# Patient Record
Sex: MALE | Race: WHITE | NOT HISPANIC OR LATINO | Employment: OTHER | URBAN - METROPOLITAN AREA
[De-identification: names, ages, dates, MRNs, and addresses within clinical notes are randomized per-mention and may not be internally consistent; named-entity substitution may affect disease eponyms.]

---

## 2017-02-10 ENCOUNTER — COMMUNICATION - HEALTHEAST (OUTPATIENT)
Dept: FAMILY MEDICINE | Facility: CLINIC | Age: 57
End: 2017-02-10

## 2017-02-10 DIAGNOSIS — I10 ESSENTIAL HYPERTENSION: ICD-10-CM

## 2017-02-27 ENCOUNTER — OFFICE VISIT - HEALTHEAST (OUTPATIENT)
Dept: FAMILY MEDICINE | Facility: CLINIC | Age: 57
End: 2017-02-27

## 2017-02-27 DIAGNOSIS — E53.8 OTHER B-COMPLEX DEFICIENCIES: ICD-10-CM

## 2017-02-27 DIAGNOSIS — Z12.5 PROSTATE CANCER SCREENING: ICD-10-CM

## 2017-02-27 DIAGNOSIS — R10.84 GENERALIZED ABDOMINAL PAIN: ICD-10-CM

## 2017-02-27 DIAGNOSIS — I10 ESSENTIAL HYPERTENSION: ICD-10-CM

## 2017-02-27 DIAGNOSIS — E78.00 PURE HYPERCHOLESTEROLEMIA: ICD-10-CM

## 2017-02-27 DIAGNOSIS — C18.9 COLON CANCER (H): ICD-10-CM

## 2017-02-27 LAB
CHOLEST SERPL-MCNC: 180 MG/DL
FASTING STATUS PATIENT QL REPORTED: YES
HDLC SERPL-MCNC: 53 MG/DL
LDLC SERPL CALC-MCNC: 98 MG/DL
PSA SERPL-MCNC: 0.5 NG/ML (ref 0–3.5)
TRIGL SERPL-MCNC: 147 MG/DL

## 2017-02-27 ASSESSMENT — MIFFLIN-ST. JEOR: SCORE: 1719.79

## 2017-02-28 ENCOUNTER — COMMUNICATION - HEALTHEAST (OUTPATIENT)
Dept: FAMILY MEDICINE | Facility: CLINIC | Age: 57
End: 2017-02-28

## 2017-04-10 ENCOUNTER — RECORDS - HEALTHEAST (OUTPATIENT)
Dept: ADMINISTRATIVE | Facility: OTHER | Age: 57
End: 2017-04-10

## 2017-04-11 ENCOUNTER — RECORDS - HEALTHEAST (OUTPATIENT)
Dept: ADMINISTRATIVE | Facility: OTHER | Age: 57
End: 2017-04-11

## 2017-04-25 ENCOUNTER — COMMUNICATION - HEALTHEAST (OUTPATIENT)
Dept: FAMILY MEDICINE | Facility: CLINIC | Age: 57
End: 2017-04-25

## 2017-04-25 DIAGNOSIS — E53.8 OTHER B-COMPLEX DEFICIENCIES: ICD-10-CM

## 2017-05-13 ENCOUNTER — COMMUNICATION - HEALTHEAST (OUTPATIENT)
Dept: FAMILY MEDICINE | Facility: CLINIC | Age: 57
End: 2017-05-13

## 2017-05-13 DIAGNOSIS — I10 ESSENTIAL HYPERTENSION: ICD-10-CM

## 2017-07-05 ENCOUNTER — COMMUNICATION - HEALTHEAST (OUTPATIENT)
Dept: FAMILY MEDICINE | Facility: CLINIC | Age: 57
End: 2017-07-05

## 2017-07-07 ENCOUNTER — OFFICE VISIT - HEALTHEAST (OUTPATIENT)
Dept: FAMILY MEDICINE | Facility: CLINIC | Age: 57
End: 2017-07-07

## 2017-07-07 DIAGNOSIS — R20.0 NUMBNESS: ICD-10-CM

## 2017-07-07 DIAGNOSIS — C18.9 COLON CANCER (H): ICD-10-CM

## 2017-07-07 DIAGNOSIS — E78.00 PURE HYPERCHOLESTEROLEMIA: ICD-10-CM

## 2017-07-07 DIAGNOSIS — I10 ESSENTIAL HYPERTENSION: ICD-10-CM

## 2017-07-07 DIAGNOSIS — E53.8 OTHER B-COMPLEX DEFICIENCIES: ICD-10-CM

## 2017-07-07 DIAGNOSIS — R10.11 RIGHT UPPER QUADRANT ABDOMINAL PAIN: ICD-10-CM

## 2017-07-07 ASSESSMENT — MIFFLIN-ST. JEOR: SCORE: 1715.25

## 2017-07-08 ENCOUNTER — AMBULATORY - HEALTHEAST (OUTPATIENT)
Dept: FAMILY MEDICINE | Facility: CLINIC | Age: 57
End: 2017-07-08

## 2017-07-08 DIAGNOSIS — C18.9 COLON CANCER (H): ICD-10-CM

## 2017-07-08 DIAGNOSIS — R10.11 RIGHT UPPER QUADRANT ABDOMINAL PAIN: ICD-10-CM

## 2017-07-10 ENCOUNTER — COMMUNICATION - HEALTHEAST (OUTPATIENT)
Dept: FAMILY MEDICINE | Facility: CLINIC | Age: 57
End: 2017-07-10

## 2017-07-11 ENCOUNTER — HOSPITAL ENCOUNTER (OUTPATIENT)
Dept: CT IMAGING | Facility: CLINIC | Age: 57
Discharge: HOME | End: 2017-07-11
Attending: FAMILY MEDICINE

## 2017-07-11 DIAGNOSIS — C18.9 COLON CANCER (H): ICD-10-CM

## 2017-07-11 DIAGNOSIS — R10.11 RIGHT UPPER QUADRANT ABDOMINAL PAIN: ICD-10-CM

## 2017-07-12 ENCOUNTER — AMBULATORY - HEALTHEAST (OUTPATIENT)
Dept: FAMILY MEDICINE | Facility: CLINIC | Age: 57
End: 2017-07-12

## 2017-07-12 DIAGNOSIS — R10.9 ABDOMINAL PAIN: ICD-10-CM

## 2017-08-14 ENCOUNTER — RECORDS - HEALTHEAST (OUTPATIENT)
Dept: ADMINISTRATIVE | Facility: OTHER | Age: 57
End: 2017-08-14

## 2017-09-07 ENCOUNTER — COMMUNICATION - HEALTHEAST (OUTPATIENT)
Dept: FAMILY MEDICINE | Facility: CLINIC | Age: 57
End: 2017-09-07

## 2017-09-07 DIAGNOSIS — E78.00 PURE HYPERCHOLESTEROLEMIA: ICD-10-CM

## 2017-11-30 ENCOUNTER — OFFICE VISIT - HEALTHEAST (OUTPATIENT)
Dept: FAMILY MEDICINE | Facility: CLINIC | Age: 57
End: 2017-11-30

## 2017-11-30 DIAGNOSIS — N52.9 IMPOTENCE OF ORGANIC ORIGIN: ICD-10-CM

## 2017-11-30 DIAGNOSIS — C18.2 MALIGNANT NEOPLASM OF ASCENDING COLON (H): ICD-10-CM

## 2017-11-30 DIAGNOSIS — I10 ESSENTIAL HYPERTENSION: ICD-10-CM

## 2017-11-30 DIAGNOSIS — E78.00 PURE HYPERCHOLESTEROLEMIA: ICD-10-CM

## 2017-11-30 RX ORDER — ASPIRIN 81 MG/1
81 TABLET, CHEWABLE ORAL DAILY
Status: SHIPPED | COMMUNITY
Start: 2017-11-30

## 2017-11-30 ASSESSMENT — MIFFLIN-ST. JEOR: SCORE: 1747.01

## 2017-12-11 ENCOUNTER — COMMUNICATION - HEALTHEAST (OUTPATIENT)
Dept: FAMILY MEDICINE | Facility: CLINIC | Age: 57
End: 2017-12-11

## 2017-12-11 DIAGNOSIS — E78.00 PURE HYPERCHOLESTEROLEMIA: ICD-10-CM

## 2018-03-07 ENCOUNTER — COMMUNICATION - HEALTHEAST (OUTPATIENT)
Dept: FAMILY MEDICINE | Facility: CLINIC | Age: 58
End: 2018-03-07

## 2018-03-07 DIAGNOSIS — I10 ESSENTIAL HYPERTENSION: ICD-10-CM

## 2018-03-16 ENCOUNTER — COMMUNICATION - HEALTHEAST (OUTPATIENT)
Dept: FAMILY MEDICINE | Facility: CLINIC | Age: 58
End: 2018-03-16

## 2018-03-16 DIAGNOSIS — N52.9 ED (ERECTILE DYSFUNCTION): ICD-10-CM

## 2018-03-16 DIAGNOSIS — E53.9 VITAMIN B-COMPLEX DEFICIENCY: ICD-10-CM

## 2018-05-14 ENCOUNTER — OFFICE VISIT - HEALTHEAST (OUTPATIENT)
Dept: FAMILY MEDICINE | Facility: CLINIC | Age: 58
End: 2018-05-14

## 2018-05-14 DIAGNOSIS — E78.00 PURE HYPERCHOLESTEROLEMIA: ICD-10-CM

## 2018-05-14 DIAGNOSIS — I10 ESSENTIAL HYPERTENSION: ICD-10-CM

## 2018-05-14 DIAGNOSIS — N52.9 IMPOTENCE OF ORGANIC ORIGIN: ICD-10-CM

## 2018-05-14 DIAGNOSIS — Z00.00 WELLNESS EXAMINATION: ICD-10-CM

## 2018-05-14 LAB
ALBUMIN SERPL-MCNC: 4.1 G/DL (ref 3.5–5)
ALP SERPL-CCNC: 88 U/L (ref 45–120)
ALT SERPL W P-5'-P-CCNC: 38 U/L (ref 0–45)
ANION GAP SERPL CALCULATED.3IONS-SCNC: 12 MMOL/L (ref 5–18)
AST SERPL W P-5'-P-CCNC: 28 U/L (ref 0–40)
BILIRUB SERPL-MCNC: 0.8 MG/DL (ref 0–1)
BUN SERPL-MCNC: 10 MG/DL (ref 8–22)
CALCIUM SERPL-MCNC: 10.3 MG/DL (ref 8.5–10.5)
CHLORIDE BLD-SCNC: 104 MMOL/L (ref 98–107)
CHOLEST SERPL-MCNC: 184 MG/DL
CO2 SERPL-SCNC: 26 MMOL/L (ref 22–31)
CREAT SERPL-MCNC: 0.81 MG/DL (ref 0.7–1.3)
ERYTHROCYTE [DISTWIDTH] IN BLOOD BY AUTOMATED COUNT: 13.1 % (ref 11–14.5)
FASTING STATUS PATIENT QL REPORTED: YES
GFR SERPL CREATININE-BSD FRML MDRD: >60 ML/MIN/1.73M2
GLUCOSE BLD-MCNC: 107 MG/DL (ref 70–125)
HCT VFR BLD AUTO: 44.3 % (ref 40–54)
HDLC SERPL-MCNC: 67 MG/DL
HGB BLD-MCNC: 15.3 G/DL (ref 14–18)
LDLC SERPL CALC-MCNC: 104 MG/DL
MCH RBC QN AUTO: 31.2 PG (ref 27–34)
MCHC RBC AUTO-ENTMCNC: 34.5 G/DL (ref 32–36)
MCV RBC AUTO: 90 FL (ref 80–100)
PLATELET # BLD AUTO: 198 THOU/UL (ref 140–440)
PMV BLD AUTO: 10 FL (ref 8.5–12.5)
POTASSIUM BLD-SCNC: 4.4 MMOL/L (ref 3.5–5)
PROT SERPL-MCNC: 7.6 G/DL (ref 6–8)
PSA SERPL-MCNC: 0.6 NG/ML (ref 0–4.5)
RBC # BLD AUTO: 4.9 MILL/UL (ref 4.4–6.2)
SODIUM SERPL-SCNC: 142 MMOL/L (ref 136–145)
TRIGL SERPL-MCNC: 64 MG/DL
WBC: 6.2 THOU/UL (ref 4–11)

## 2018-05-14 ASSESSMENT — MIFFLIN-ST. JEOR: SCORE: 1771.39

## 2018-05-15 ENCOUNTER — COMMUNICATION - HEALTHEAST (OUTPATIENT)
Dept: FAMILY MEDICINE | Facility: CLINIC | Age: 58
End: 2018-05-15

## 2018-07-03 ENCOUNTER — COMMUNICATION - HEALTHEAST (OUTPATIENT)
Dept: FAMILY MEDICINE | Facility: CLINIC | Age: 58
End: 2018-07-03

## 2018-07-03 DIAGNOSIS — I10 ESSENTIAL HYPERTENSION: ICD-10-CM

## 2018-10-29 ENCOUNTER — OFFICE VISIT - HEALTHEAST (OUTPATIENT)
Dept: FAMILY MEDICINE | Facility: CLINIC | Age: 58
End: 2018-10-29

## 2018-10-29 DIAGNOSIS — R07.9 ACUTE CHEST PAIN: ICD-10-CM

## 2018-10-29 DIAGNOSIS — M62.81 GENERALIZED MUSCLE WEAKNESS: ICD-10-CM

## 2018-10-29 DIAGNOSIS — Z00.00 ROUTINE ADULT HEALTH MAINTENANCE: ICD-10-CM

## 2018-10-29 LAB
ANION GAP SERPL CALCULATED.3IONS-SCNC: 11 MMOL/L (ref 5–18)
ATRIAL RATE - MUSE: 69 BPM
BUN SERPL-MCNC: 9 MG/DL (ref 8–22)
CALCIUM SERPL-MCNC: 10.8 MG/DL (ref 8.5–10.5)
CHLORIDE BLD-SCNC: 102 MMOL/L (ref 98–107)
CO2 SERPL-SCNC: 26 MMOL/L (ref 22–31)
CREAT SERPL-MCNC: 0.77 MG/DL (ref 0.7–1.3)
DIASTOLIC BLOOD PRESSURE - MUSE: NORMAL MMHG
ERYTHROCYTE [DISTWIDTH] IN BLOOD BY AUTOMATED COUNT: 11.9 % (ref 11–14.5)
GFR SERPL CREATININE-BSD FRML MDRD: >60 ML/MIN/1.73M2
GLUCOSE BLD-MCNC: 110 MG/DL (ref 70–125)
HCT VFR BLD AUTO: 42.8 % (ref 40–54)
HGB BLD-MCNC: 15 G/DL (ref 14–18)
INTERPRETATION ECG - MUSE: NORMAL
MCH RBC QN AUTO: 31.9 PG (ref 27–34)
MCHC RBC AUTO-ENTMCNC: 35.2 G/DL (ref 32–36)
MCV RBC AUTO: 91 FL (ref 80–100)
P AXIS - MUSE: 35 DEGREES
PLATELET # BLD AUTO: 209 THOU/UL (ref 140–440)
PMV BLD AUTO: 7.8 FL (ref 7–10)
POTASSIUM BLD-SCNC: 4.4 MMOL/L (ref 3.5–5)
PR INTERVAL - MUSE: 168 MS
QRS DURATION - MUSE: 88 MS
QT - MUSE: 388 MS
QTC - MUSE: 415 MS
R AXIS - MUSE: 18 DEGREES
RBC # BLD AUTO: 4.72 MILL/UL (ref 4.4–6.2)
SODIUM SERPL-SCNC: 139 MMOL/L (ref 136–145)
SYSTOLIC BLOOD PRESSURE - MUSE: NORMAL MMHG
T AXIS - MUSE: 13 DEGREES
VENTRICULAR RATE- MUSE: 69 BPM
WBC: 5.6 THOU/UL (ref 4–11)

## 2018-12-11 ENCOUNTER — COMMUNICATION - HEALTHEAST (OUTPATIENT)
Dept: FAMILY MEDICINE | Facility: CLINIC | Age: 58
End: 2018-12-11

## 2018-12-11 DIAGNOSIS — I10 ESSENTIAL HYPERTENSION: ICD-10-CM

## 2019-01-14 ENCOUNTER — COMMUNICATION - HEALTHEAST (OUTPATIENT)
Dept: FAMILY MEDICINE | Facility: CLINIC | Age: 59
End: 2019-01-14

## 2019-01-14 DIAGNOSIS — E78.00 PURE HYPERCHOLESTEROLEMIA: ICD-10-CM

## 2019-04-25 ENCOUNTER — COMMUNICATION - HEALTHEAST (OUTPATIENT)
Dept: FAMILY MEDICINE | Facility: CLINIC | Age: 59
End: 2019-04-25

## 2019-04-25 DIAGNOSIS — E53.9 VITAMIN B-COMPLEX DEFICIENCY: ICD-10-CM

## 2019-05-20 ENCOUNTER — OFFICE VISIT - HEALTHEAST (OUTPATIENT)
Dept: FAMILY MEDICINE | Facility: CLINIC | Age: 59
End: 2019-05-20

## 2019-05-20 ENCOUNTER — COMMUNICATION - HEALTHEAST (OUTPATIENT)
Dept: FAMILY MEDICINE | Facility: CLINIC | Age: 59
End: 2019-05-20

## 2019-05-20 DIAGNOSIS — Z00.00 ROUTINE ADULT HEALTH MAINTENANCE: ICD-10-CM

## 2019-05-20 DIAGNOSIS — R06.00 DYSPNEA, UNSPECIFIED TYPE: ICD-10-CM

## 2019-05-20 DIAGNOSIS — E66.3 OVERWEIGHT: ICD-10-CM

## 2019-05-20 LAB
ANION GAP SERPL CALCULATED.3IONS-SCNC: 11 MMOL/L (ref 5–18)
BUN SERPL-MCNC: 11 MG/DL (ref 8–22)
CALCIUM SERPL-MCNC: 10.2 MG/DL (ref 8.5–10.5)
CHLORIDE BLD-SCNC: 106 MMOL/L (ref 98–107)
CHOLEST SERPL-MCNC: 158 MG/DL
CO2 SERPL-SCNC: 24 MMOL/L (ref 22–31)
CREAT SERPL-MCNC: 0.8 MG/DL (ref 0.7–1.3)
ERYTHROCYTE [DISTWIDTH] IN BLOOD BY AUTOMATED COUNT: 13 % (ref 11–14.5)
FASTING STATUS PATIENT QL REPORTED: YES
GFR SERPL CREATININE-BSD FRML MDRD: >60 ML/MIN/1.73M2
GLUCOSE BLD-MCNC: 106 MG/DL (ref 70–125)
HBA1C MFR BLD: 5.6 % (ref 3.5–6)
HCT VFR BLD AUTO: 46.7 % (ref 40–54)
HDLC SERPL-MCNC: 51 MG/DL
HGB BLD-MCNC: 15.8 G/DL (ref 14–18)
LDLC SERPL CALC-MCNC: 78 MG/DL
MCH RBC QN AUTO: 32.8 PG (ref 27–34)
MCHC RBC AUTO-ENTMCNC: 33.9 G/DL (ref 32–36)
MCV RBC AUTO: 97 FL (ref 80–100)
PLATELET # BLD AUTO: 214 THOU/UL (ref 140–440)
PMV BLD AUTO: 7.7 FL (ref 7–10)
POTASSIUM BLD-SCNC: 4.3 MMOL/L (ref 3.5–5)
PSA SERPL-MCNC: 0.4 NG/ML (ref 0–4.5)
RBC # BLD AUTO: 4.82 MILL/UL (ref 4.4–6.2)
SODIUM SERPL-SCNC: 141 MMOL/L (ref 136–145)
TRIGL SERPL-MCNC: 143 MG/DL
WBC: 5.9 THOU/UL (ref 4–11)

## 2019-05-20 ASSESSMENT — MIFFLIN-ST. JEOR: SCORE: 1784.42

## 2019-06-05 ENCOUNTER — HOSPITAL ENCOUNTER (OUTPATIENT)
Dept: CARDIOLOGY | Facility: CLINIC | Age: 59
Discharge: HOME | End: 2019-06-05
Attending: FAMILY MEDICINE

## 2019-06-05 DIAGNOSIS — R06.00 DYSPNEA, UNSPECIFIED TYPE: ICD-10-CM

## 2019-06-05 LAB
CV STRESS CURRENT BP HE: NORMAL
CV STRESS CURRENT HR HE: 106
CV STRESS CURRENT HR HE: 106
CV STRESS CURRENT HR HE: 117
CV STRESS CURRENT HR HE: 119
CV STRESS CURRENT HR HE: 125
CV STRESS CURRENT HR HE: 126
CV STRESS CURRENT HR HE: 133
CV STRESS CURRENT HR HE: 133
CV STRESS CURRENT HR HE: 137
CV STRESS CURRENT HR HE: 137
CV STRESS CURRENT HR HE: 138
CV STRESS CURRENT HR HE: 138
CV STRESS CURRENT HR HE: 143
CV STRESS CURRENT HR HE: 153
CV STRESS CURRENT HR HE: 156
CV STRESS CURRENT HR HE: 156
CV STRESS CURRENT HR HE: 89
CV STRESS CURRENT HR HE: 90
CV STRESS CURRENT HR HE: 90
CV STRESS CURRENT HR HE: 92
CV STRESS CURRENT HR HE: 94
CV STRESS CURRENT HR HE: 95
CV STRESS CURRENT HR HE: 98
CV STRESS CURRENT HR HE: 98
CV STRESS DEVIATION TIME HE: NORMAL
CV STRESS ECHO PERCENT HR HE: NORMAL
CV STRESS EXERCISE STAGE HE: NORMAL
CV STRESS FINAL RESTING BP HE: NORMAL
CV STRESS FINAL RESTING HR HE: 89
CV STRESS MAX HR HE: 157
CV STRESS MAX TREADMILL GRADE HE: 14
CV STRESS MAX TREADMILL SPEED HE: 3.4
CV STRESS PEAK DIA BP HE: NORMAL
CV STRESS PEAK SYS BP HE: NORMAL
CV STRESS PHASE HE: NORMAL
CV STRESS PROTOCOL HE: NORMAL
CV STRESS RESTING PT POSITION HE: NORMAL
CV STRESS RESTING PT POSITION HE: NORMAL
CV STRESS ST DEVIATION AMOUNT HE: NORMAL
CV STRESS ST DEVIATION ELEVATION HE: NORMAL
CV STRESS ST EVELATION AMOUNT HE: NORMAL
CV STRESS TEST TYPE HE: NORMAL
CV STRESS TOTAL STAGE TIME MIN 1 HE: NORMAL
ECHO EJECTION FRACTION ESTIMATED: 60 %
STRESS ECHO BASELINE BP: NORMAL
STRESS ECHO BASELINE HR: 91
STRESS ECHO CALCULATED PERCENT HR: 99 %
STRESS ECHO LAST STRESS BP: NORMAL
STRESS ECHO LAST STRESS HR: 156
STRESS ECHO POST ESTIMATED WORKLOAD: 8.7
STRESS ECHO POST EXERCISE DUR MIN: 7
STRESS ECHO POST EXERCISE DUR SEC: 15
STRESS ECHO TARGET HR: 135

## 2019-12-31 ENCOUNTER — COMMUNICATION - HEALTHEAST (OUTPATIENT)
Dept: FAMILY MEDICINE | Facility: CLINIC | Age: 59
End: 2019-12-31

## 2019-12-31 DIAGNOSIS — I10 ESSENTIAL HYPERTENSION: ICD-10-CM

## 2020-02-06 ENCOUNTER — COMMUNICATION - HEALTHEAST (OUTPATIENT)
Dept: FAMILY MEDICINE | Facility: CLINIC | Age: 60
End: 2020-02-06

## 2020-02-06 DIAGNOSIS — E78.00 PURE HYPERCHOLESTEROLEMIA: ICD-10-CM

## 2020-05-05 ENCOUNTER — COMMUNICATION - HEALTHEAST (OUTPATIENT)
Dept: FAMILY MEDICINE | Facility: CLINIC | Age: 60
End: 2020-05-05

## 2020-05-05 ENCOUNTER — OFFICE VISIT - HEALTHEAST (OUTPATIENT)
Dept: FAMILY MEDICINE | Facility: CLINIC | Age: 60
End: 2020-05-05

## 2020-05-05 DIAGNOSIS — E78.00 PURE HYPERCHOLESTEROLEMIA: ICD-10-CM

## 2020-05-05 DIAGNOSIS — I10 ESSENTIAL HYPERTENSION: ICD-10-CM

## 2020-05-05 DIAGNOSIS — E53.9 VITAMIN B-COMPLEX DEFICIENCY: ICD-10-CM

## 2020-06-18 ENCOUNTER — COMMUNICATION - HEALTHEAST (OUTPATIENT)
Dept: FAMILY MEDICINE | Facility: CLINIC | Age: 60
End: 2020-06-18

## 2020-06-18 DIAGNOSIS — E53.9 VITAMIN B-COMPLEX DEFICIENCY: ICD-10-CM

## 2020-08-11 ENCOUNTER — COMMUNICATION - HEALTHEAST (OUTPATIENT)
Dept: FAMILY MEDICINE | Facility: CLINIC | Age: 60
End: 2020-08-11

## 2020-08-11 DIAGNOSIS — E78.00 PURE HYPERCHOLESTEROLEMIA: ICD-10-CM

## 2020-10-20 ENCOUNTER — OFFICE VISIT - HEALTHEAST (OUTPATIENT)
Dept: FAMILY MEDICINE | Facility: CLINIC | Age: 60
End: 2020-10-20

## 2020-10-20 DIAGNOSIS — I10 HYPERTENSION, UNSPECIFIED TYPE: ICD-10-CM

## 2020-10-20 DIAGNOSIS — Z00.00 ROUTINE ADULT HEALTH MAINTENANCE: ICD-10-CM

## 2020-10-20 DIAGNOSIS — R10.13 ABDOMINAL PAIN, EPIGASTRIC: ICD-10-CM

## 2020-10-20 LAB
ANION GAP SERPL CALCULATED.3IONS-SCNC: 10 MMOL/L (ref 5–18)
BUN SERPL-MCNC: 9 MG/DL (ref 8–22)
CALCIUM SERPL-MCNC: 9.8 MG/DL (ref 8.5–10.5)
CHLORIDE BLD-SCNC: 103 MMOL/L (ref 98–107)
CHOLEST SERPL-MCNC: 198 MG/DL
CO2 SERPL-SCNC: 27 MMOL/L (ref 22–31)
CREAT SERPL-MCNC: 0.8 MG/DL (ref 0.7–1.3)
ERYTHROCYTE [DISTWIDTH] IN BLOOD BY AUTOMATED COUNT: 13.7 % (ref 11–14.5)
FASTING STATUS PATIENT QL REPORTED: YES
GFR SERPL CREATININE-BSD FRML MDRD: >60 ML/MIN/1.73M2
GLUCOSE BLD-MCNC: 105 MG/DL (ref 70–125)
HCT VFR BLD AUTO: 46.2 % (ref 40–54)
HDLC SERPL-MCNC: 69 MG/DL
HGB BLD-MCNC: 15.6 G/DL (ref 14–18)
LDLC SERPL CALC-MCNC: 105 MG/DL
MCH RBC QN AUTO: 30 PG (ref 27–34)
MCHC RBC AUTO-ENTMCNC: 33.8 G/DL (ref 32–36)
MCV RBC AUTO: 89 FL (ref 80–100)
PLATELET # BLD AUTO: 193 THOU/UL (ref 140–440)
PMV BLD AUTO: 7.3 FL (ref 7–10)
POTASSIUM BLD-SCNC: 4.6 MMOL/L (ref 3.5–5)
PSA SERPL-MCNC: 1 NG/ML (ref 0–4.5)
RBC # BLD AUTO: 5.21 MILL/UL (ref 4.4–6.2)
SODIUM SERPL-SCNC: 140 MMOL/L (ref 136–145)
TRIGL SERPL-MCNC: 121 MG/DL
WBC: 5.4 THOU/UL (ref 4–11)

## 2020-10-21 ENCOUNTER — COMMUNICATION - HEALTHEAST (OUTPATIENT)
Dept: FAMILY MEDICINE | Facility: CLINIC | Age: 60
End: 2020-10-21

## 2020-10-29 ENCOUNTER — HOSPITAL ENCOUNTER (OUTPATIENT)
Dept: CT IMAGING | Facility: CLINIC | Age: 60
Discharge: HOME | End: 2020-10-29
Attending: FAMILY MEDICINE

## 2020-11-10 ENCOUNTER — HOSPITAL ENCOUNTER (OUTPATIENT)
Dept: RADIOLOGY | Facility: CLINIC | Age: 60
Discharge: HOME | End: 2020-11-10
Attending: FAMILY MEDICINE

## 2020-11-10 ENCOUNTER — HOSPITAL ENCOUNTER (OUTPATIENT)
Dept: MRI IMAGING | Facility: CLINIC | Age: 60
Discharge: HOME | End: 2020-11-10
Attending: FAMILY MEDICINE

## 2020-11-10 ENCOUNTER — AMBULATORY - HEALTHEAST (OUTPATIENT)
Dept: FAMILY MEDICINE | Facility: CLINIC | Age: 60
End: 2020-11-10

## 2020-11-10 DIAGNOSIS — R10.13 ABDOMINAL PAIN, EPIGASTRIC: ICD-10-CM

## 2020-11-10 DIAGNOSIS — K86.89 PANCREATIC MASS: ICD-10-CM

## 2020-11-10 DIAGNOSIS — M79.5 FOREIGN BODY (FB) IN SOFT TISSUE: ICD-10-CM

## 2020-11-11 ENCOUNTER — COMMUNICATION - HEALTHEAST (OUTPATIENT)
Dept: FAMILY MEDICINE | Facility: CLINIC | Age: 60
End: 2020-11-11

## 2020-11-11 DIAGNOSIS — I10 HYPERTENSION, UNSPECIFIED TYPE: ICD-10-CM

## 2020-11-15 RX ORDER — LISINOPRIL 10 MG/1
10 TABLET ORAL DAILY
Qty: 90 TABLET | Refills: 3 | Status: SHIPPED | OUTPATIENT
Start: 2020-11-15 | End: 2021-10-06

## 2020-11-16 ENCOUNTER — RECORDS - HEALTHEAST (OUTPATIENT)
Dept: ADMINISTRATIVE | Facility: OTHER | Age: 60
End: 2020-11-16

## 2020-11-18 ENCOUNTER — COMMUNICATION - HEALTHEAST (OUTPATIENT)
Dept: FAMILY MEDICINE | Facility: CLINIC | Age: 60
End: 2020-11-18

## 2020-11-18 DIAGNOSIS — I10 ESSENTIAL HYPERTENSION: ICD-10-CM

## 2020-11-19 RX ORDER — HYDROCHLOROTHIAZIDE 12.5 MG/1
CAPSULE ORAL
Qty: 90 CAPSULE | Refills: 3 | Status: SHIPPED | OUTPATIENT
Start: 2020-11-19 | End: 2021-10-06

## 2020-12-02 ENCOUNTER — OFFICE VISIT - HEALTHEAST (OUTPATIENT)
Dept: FAMILY MEDICINE | Facility: CLINIC | Age: 60
End: 2020-12-02

## 2020-12-02 DIAGNOSIS — N52.9 ED (ERECTILE DYSFUNCTION): ICD-10-CM

## 2020-12-02 DIAGNOSIS — Z01.818 PRE-OP EXAM: ICD-10-CM

## 2020-12-02 LAB
ANION GAP SERPL CALCULATED.3IONS-SCNC: 11 MMOL/L (ref 5–18)
BUN SERPL-MCNC: 10 MG/DL (ref 8–22)
CALCIUM SERPL-MCNC: 9.8 MG/DL (ref 8.5–10.5)
CHLORIDE BLD-SCNC: 100 MMOL/L (ref 98–107)
CO2 SERPL-SCNC: 27 MMOL/L (ref 22–31)
CREAT SERPL-MCNC: 0.83 MG/DL (ref 0.7–1.3)
ERYTHROCYTE [DISTWIDTH] IN BLOOD BY AUTOMATED COUNT: 12.4 % (ref 11–14.5)
GFR SERPL CREATININE-BSD FRML MDRD: >60 ML/MIN/1.73M2
GLUCOSE BLD-MCNC: 101 MG/DL (ref 70–125)
HCT VFR BLD AUTO: 46.2 % (ref 40–54)
HGB BLD-MCNC: 15.2 G/DL (ref 14–18)
MCH RBC QN AUTO: 28.8 PG (ref 27–34)
MCHC RBC AUTO-ENTMCNC: 32.9 G/DL (ref 32–36)
MCV RBC AUTO: 88 FL (ref 80–100)
PLATELET # BLD AUTO: 227 THOU/UL (ref 140–440)
PMV BLD AUTO: 6.9 FL (ref 7–10)
POTASSIUM BLD-SCNC: 4.4 MMOL/L (ref 3.5–5)
RBC # BLD AUTO: 5.28 MILL/UL (ref 4.4–6.2)
SODIUM SERPL-SCNC: 138 MMOL/L (ref 136–145)
WBC: 5.7 THOU/UL (ref 4–11)

## 2020-12-02 RX ORDER — SILDENAFIL 100 MG/1
100 TABLET, FILM COATED ORAL DAILY PRN
Qty: 16 TABLET | Refills: 3 | Status: SHIPPED | OUTPATIENT
Start: 2020-12-02 | End: 2021-10-06

## 2020-12-02 ASSESSMENT — MIFFLIN-ST. JEOR: SCORE: 1697.68

## 2020-12-03 ENCOUNTER — RECORDS - HEALTHEAST (OUTPATIENT)
Dept: ADMINISTRATIVE | Facility: OTHER | Age: 60
End: 2020-12-03

## 2021-01-26 ENCOUNTER — OFFICE VISIT - HEALTHEAST (OUTPATIENT)
Dept: FAMILY MEDICINE | Facility: CLINIC | Age: 61
End: 2021-01-26

## 2021-01-26 DIAGNOSIS — Z01.818 PRE-OP EXAM: ICD-10-CM

## 2021-01-26 LAB
ALBUMIN SERPL-MCNC: 4.3 G/DL (ref 3.5–5)
ALP SERPL-CCNC: 77 U/L (ref 45–120)
ALT SERPL W P-5'-P-CCNC: 49 U/L (ref 0–45)
ANION GAP SERPL CALCULATED.3IONS-SCNC: 10 MMOL/L (ref 5–18)
AST SERPL W P-5'-P-CCNC: 29 U/L (ref 0–40)
BILIRUB SERPL-MCNC: 0.6 MG/DL (ref 0–1)
BUN SERPL-MCNC: 11 MG/DL (ref 8–22)
CALCIUM SERPL-MCNC: 9.7 MG/DL (ref 8.5–10.5)
CHLORIDE BLD-SCNC: 101 MMOL/L (ref 98–107)
CO2 SERPL-SCNC: 27 MMOL/L (ref 22–31)
CREAT SERPL-MCNC: 0.79 MG/DL (ref 0.7–1.3)
ERYTHROCYTE [DISTWIDTH] IN BLOOD BY AUTOMATED COUNT: 13 % (ref 11–14.5)
GFR SERPL CREATININE-BSD FRML MDRD: >60 ML/MIN/1.73M2
GLUCOSE BLD-MCNC: 97 MG/DL (ref 70–125)
HCT VFR BLD AUTO: 45 % (ref 40–54)
HGB BLD-MCNC: 14.8 G/DL (ref 14–18)
MCH RBC QN AUTO: 28.7 PG (ref 27–34)
MCHC RBC AUTO-ENTMCNC: 32.9 G/DL (ref 32–36)
MCV RBC AUTO: 87 FL (ref 80–100)
PLATELET # BLD AUTO: 240 THOU/UL (ref 140–440)
PMV BLD AUTO: 7.7 FL (ref 7–10)
POTASSIUM BLD-SCNC: 4.1 MMOL/L (ref 3.5–5)
PROT SERPL-MCNC: 7.4 G/DL (ref 6–8)
RBC # BLD AUTO: 5.16 MILL/UL (ref 4.4–6.2)
SODIUM SERPL-SCNC: 138 MMOL/L (ref 136–145)
WBC: 5.7 THOU/UL (ref 4–11)

## 2021-01-26 ASSESSMENT — MIFFLIN-ST. JEOR: SCORE: 1715.26

## 2021-03-22 ENCOUNTER — RECORDS - HEALTHEAST (OUTPATIENT)
Dept: ADMINISTRATIVE | Facility: OTHER | Age: 61
End: 2021-03-22

## 2021-03-25 ENCOUNTER — AMBULATORY - HEALTHEAST (OUTPATIENT)
Dept: NURSING | Facility: CLINIC | Age: 61
End: 2021-03-25

## 2021-04-15 ENCOUNTER — AMBULATORY - HEALTHEAST (OUTPATIENT)
Dept: NURSING | Facility: CLINIC | Age: 61
End: 2021-04-15

## 2021-04-22 ENCOUNTER — RECORDS - HEALTHEAST (OUTPATIENT)
Dept: ADMINISTRATIVE | Facility: OTHER | Age: 61
End: 2021-04-22

## 2021-04-23 ENCOUNTER — RECORDS - HEALTHEAST (OUTPATIENT)
Dept: ADMINISTRATIVE | Facility: OTHER | Age: 61
End: 2021-04-23

## 2021-05-11 ENCOUNTER — HOSPITAL ENCOUNTER (OUTPATIENT)
Dept: CT IMAGING | Facility: CLINIC | Age: 61
Discharge: HOME | End: 2021-05-11
Attending: INTERNAL MEDICINE

## 2021-05-11 DIAGNOSIS — Z85.038 PERSONAL HISTORY OF COLON CANCER: ICD-10-CM

## 2021-05-11 DIAGNOSIS — K63.5 POLYP OF SIGMOID COLON, UNSPECIFIED TYPE: ICD-10-CM

## 2021-05-11 DIAGNOSIS — K57.30 DIVERTICULOSIS OF COLON: ICD-10-CM

## 2021-05-11 DIAGNOSIS — K52.9 ILEITIS: ICD-10-CM

## 2021-05-12 ENCOUNTER — COMMUNICATION - HEALTHEAST (OUTPATIENT)
Dept: FAMILY MEDICINE | Facility: CLINIC | Age: 61
End: 2021-05-12

## 2021-05-12 DIAGNOSIS — E78.00 PURE HYPERCHOLESTEROLEMIA: ICD-10-CM

## 2021-05-12 RX ORDER — SIMVASTATIN 40 MG
TABLET ORAL
Qty: 90 TABLET | Refills: 2 | Status: SHIPPED | OUTPATIENT
Start: 2021-05-12 | End: 2021-10-06

## 2021-05-25 ENCOUNTER — RECORDS - HEALTHEAST (OUTPATIENT)
Dept: ADMINISTRATIVE | Facility: CLINIC | Age: 61
End: 2021-05-25

## 2021-05-26 ENCOUNTER — RECORDS - HEALTHEAST (OUTPATIENT)
Dept: ADMINISTRATIVE | Facility: CLINIC | Age: 61
End: 2021-05-26

## 2021-05-27 ENCOUNTER — RECORDS - HEALTHEAST (OUTPATIENT)
Dept: ADMINISTRATIVE | Facility: CLINIC | Age: 61
End: 2021-05-27

## 2021-05-28 NOTE — TELEPHONE ENCOUNTER
RN cannot approve Refill Request    RN can NOT refill this medication Protocol failed and NO refill given.         Rika Nelson, [facility] Triage/Med Refill 4/25/2019    Requested Prescriptions   Pending Prescriptions Disp Refills     cyanocobalamin 1,000 mcg/mL injection 3 mL 3     Sig: Inject 1 mL (1,000 mcg total) into the shoulder, thigh, or buttocks every 30 (thirty) days.       Cyanocobalamin (Vitamin B12)  Refill Protocol Failed - 4/25/2019 12:24 PM        Failed - Vitamin B12 level in last 12 months     Vitamin B-12   Date Value Ref Range Status   07/07/2017 382 213 - 816 pg/mL Final             Passed - PCP or prescribing provider visit in past 12 months       Last office visit with prescriber/PCP: 10/29/2018 Tomasa Epperson MD OR same dept: 10/29/2018 Tomasa Epperson MD OR same specialty: 10/29/2018 Tomasa Epperson MD Last physical: Visit date not found Last MTM visit: Visit date not found    Next visit within 3 mo: Visit date not found  Next physical within 3 mo: Visit date not found  Prescriber OR PCP: Tomasa Epperson MD  Last diagnosis associated with med order: 1. Vitamin B-complex deficiency  - cyanocobalamin 1,000 mcg/mL injection; Inject 1 mL (1,000 mcg total) into the shoulder, thigh, or buttocks every 30 (thirty) days.  Dispense: 3 mL; Refill: 3               Passed - CBC in last 12 months     WBC   Date Value Ref Range Status   10/29/2018 5.6 4.0 - 11.0 thou/uL Final     RBC   Date Value Ref Range Status   10/29/2018 4.72 4.40 - 6.20 mill/uL Final     Hemoglobin   Date Value Ref Range Status   10/29/2018 15.0 14.0 - 18.0 g/dL Final     Hematocrit   Date Value Ref Range Status   10/29/2018 42.8 40.0 - 54.0 % Final     MCV   Date Value Ref Range Status   10/29/2018 91 80 - 100 fL Final     MCH   Date Value Ref Range Status   10/29/2018 31.9 27.0 - 34.0 pg Final     MCHC   Date Value Ref Range Status   10/29/2018 35.2 32.0 - 36.0 g/dL Final     RDW   Date Value Ref  Range Status   10/29/2018 11.9 11.0 - 14.5 % Final     Platelets   Date Value Ref Range Status   10/29/2018 209 140 - 440 thou/uL Final     MPV   Date Value Ref Range Status   10/29/2018 7.8 7.0 - 10.0 fL Final

## 2021-05-28 NOTE — TELEPHONE ENCOUNTER
Please expedite. Patient states his pharmacy has been requesting this by fax since 4/09/19. Patient was informed that no request has been entered prior to today.        Refill Request  Did you contact pharmacy: Yes  Medication name:   Requested Prescriptions     Pending Prescriptions Disp Refills     cyanocobalamin 1,000 mcg/mL injection 3 mL 3     Sig: Inject 1 mL (1,000 mcg total) into the shoulder, thigh, or buttocks every 30 (thirty) days.     Who prescribed the medication: Tomasa Epperson MD  Pharmacy Name and Location: [id number]  Is patient out of medication: Yes  Patient notified refills processed in 72 hours:  yes  Okay to leave a detailed message: yes

## 2021-05-28 NOTE — PROGRESS NOTES
Assessment:      Healthy male exam.      Plan:       All questions answered.     1. Routine adult health maintenance  Encouraged dental exams, exercise, weight loss, and healthy diet.  Update immunizations today.  - Tdap vaccine greater than or equal to 6yo IM  - HM2(CBC w/o Differential)  - Basic Metabolic Panel  - PSA (Prostatic-Specific Antigen), Annual Screen  - Lipid Cascade  - Glycosylated Hemoglobin A1c    2. Dyspnea-new and separate problem  The patient reports dyspnea.  He denies chest pain and has had similar episodes in the distant past with normal EKGs.  Because of the new onset and his history of risk factors will order stress echocardiogram.  - Echo Stress Exercise; Future    3. Overweight  Encourage weight loss and exercise    RTC 1 year    Subjective:      Norris Jolly is a 59 y.o. male who presents for an annual exam. He is a retired  and has a new problem of dyspnea.  He notes that he gets dyspneic with minor walking and going upstairs.  He denies any chest pain, palpitations or fluttering.  Occasionally he has feelings of lightheadedness but it is intermittent.  He continues on the simvastatin Hydrocort thiazide.    Healthy Habits:   Regular Exercise: Yes  Sunscreen Use: No  Healthy Diet: Yes  Dental Visits Regularly: Yes  Seat Belt: Yes  Sexually active: Yes  Monthly Self Testicular Exams:  Yes  Hemoccults: no  Flex Sig: No  Colonoscopy: Yes  Lipid Profile: Yes  Glucose Screen: Yes  Prevention of Osteoporosis: Yes  Last Dexa: No  Guns at Home:  Yes  Guns Safety Locks:  Yes      Immunization History   Administered Date(s) Administered     DT (pediatric) 12/14/2005     Influenza,seasonal quad, PF, 36+MOS 10/29/2018     Td,adult,historic,unspecified 12/14/2005     Immunization status: up to date and documented.    No exam data present     Current Outpatient Medications   Medication Sig Dispense Refill     aspirin 81 mg chewable tablet Chew 81 mg daily.       cyanocobalamin 1,000  "mcg/mL injection Inject 1 mL (1,000 mcg total) into the shoulder, thigh, or buttocks every 30 (thirty) days. 3 mL 3     hydroCHLOROthiazide (MICROZIDE) 12.5 mg capsule Take one capsule by mouth in the morning.. 90 capsule 3     multivitamin therapeutic (THERAGRAN) tablet Take 1 tablet by mouth daily.  0     sildenafil (VIAGRA) 100 MG tablet Take 1 tablet (100 mg total) by mouth daily as needed for erectile dysfunction. 16 tablet 3     simvastatin (ZOCOR) 40 MG tablet TAKE 1 TABLET BY MOUTH AT BEDTIME. 90 tablet 3     SYRINGE, DISPOSABLE, MISC Syringe 21G X 1-1/2\" 3 ML Miscellaneous     Use with B12 shots qmonth       No current facility-administered medications for this visit.      Past Medical History:   Diagnosis Date     Colon cancer (H)      Hypertension      Past Surgical History:   Procedure Laterality Date     COLON SURGERY       Patient has no known allergies.  Family History   Problem Relation Age of Onset     Hyperlipidemia Father      Hypertension Father      Social History     Socioeconomic History     Marital status:      Spouse name: Not on file     Number of children: Not on file     Years of education: Not on file     Highest education level: Not on file   Occupational History     Not on file   Social Needs     Financial resource strain: Not on file     Food insecurity:     Worry: Not on file     Inability: Not on file     Transportation needs:     Medical: Not on file     Non-medical: Not on file   Tobacco Use     Smoking status: Former Smoker     Smokeless tobacco: Former User     Quit date: 3/25/1985   Substance and Sexual Activity     Alcohol use: Yes     Drug use: No     Sexual activity: Yes     Partners: Female   Lifestyle     Physical activity:     Days per week: Not on file     Minutes per session: Not on file     Stress: Not on file   Relationships     Social connections:     Talks on phone: Not on file     Gets together: Not on file     Attends Quaker service: Not on file     " "Active member of club or organization: Not on file     Attends meetings of clubs or organizations: Not on file     Relationship status: Not on file     Intimate partner violence:     Fear of current or ex partner: Not on file     Emotionally abused: Not on file     Physically abused: Not on file     Forced sexual activity: Not on file   Other Topics Concern     Not on file   Social History Narrative     Not on file       Review of Systems  Review of Systems          Objective:     Vitals:    05/20/19 0850   BP: 138/78   Pulse: 78   Resp: 16   SpO2: 95%   Weight: (!) 225 lb 8 oz (102.3 kg)   Height: 5' 7.5\" (1.715 m)     Body mass index is 34.8 kg/m .    Physical  Physical Exam     Constitutional:    --Vitals as above  --No acute distress  Eyes-  --Sclera noninjected  --Lids and conjunctiva normal  ENT-  --TMs clear  --Sclera noninjected  --Pharynx not erythematous  Neck-  --Neck supple with no cervical lymphadenopathy  Lungs-  --Clear to Auscultation  Heart-  --Regular rate and rhythm  Abdomen--  --Soft, non-tender, non-distended  Skin-  --Pink and dry  Psych-  --Alert and oriented  --Normal affect        "

## 2021-05-30 VITALS — BODY MASS INDEX: 30.51 KG/M2 | WEIGHT: 206 LBS | HEIGHT: 69 IN

## 2021-05-31 VITALS — HEIGHT: 69 IN | BODY MASS INDEX: 31.4 KG/M2 | WEIGHT: 212 LBS

## 2021-05-31 VITALS — HEIGHT: 69 IN | BODY MASS INDEX: 30.36 KG/M2 | WEIGHT: 205 LBS

## 2021-06-01 VITALS — WEIGHT: 217.38 LBS | BODY MASS INDEX: 32.2 KG/M2 | HEIGHT: 69 IN

## 2021-06-02 VITALS — BODY MASS INDEX: 31.21 KG/M2 | WEIGHT: 211.31 LBS

## 2021-06-03 VITALS — HEIGHT: 68 IN | WEIGHT: 225.5 LBS | BODY MASS INDEX: 34.17 KG/M2

## 2021-06-04 NOTE — TELEPHONE ENCOUNTER
Refill Approved    Rx renewed per Medication Renewal Policy. Medication was last renewed on 12/13/18.    Rika Nelson, [facility] Triage/Med Refill 1/2/2020     Requested Prescriptions   Pending Prescriptions Disp Refills     hydroCHLOROthiazide (MICROZIDE) 12.5 mg capsule [Pharmacy Med Name: HYDROCHLOROTHIAZIDE 12.5 MG CP] 90 capsule 3     Sig: TAKE 1 CAPSULE BY MOUTH EVERY DAY IN THE MORNING       Diuretics/Combination Diuretics Refill Protocol  Passed - 12/31/2019  2:01 AM        Passed - Visit with PCP or prescribing provider visit in past 12 months     Last office visit with prescriber/PCP: 10/29/2018 Tomasa Epperson MD OR same dept: Visit date not found OR same specialty: 10/29/2018 Tomasa Epperson MD  Last physical: 5/20/2019 Last MTM visit: Visit date not found   Next visit within 3 mo: Visit date not found  Next physical within 3 mo: Visit date not found  Prescriber OR PCP: Tomasa Epperson MD  Last diagnosis associated with med order: 1. Essential hypertension  - hydroCHLOROthiazide (MICROZIDE) 12.5 mg capsule [Pharmacy Med Name: HYDROCHLOROTHIAZIDE 12.5 MG CP]; TAKE 1 CAPSULE BY MOUTH EVERY DAY IN THE MORNING  Dispense: 90 capsule; Refill: 3    If protocol passes may refill for 12 months if within 3 months of last provider visit (or a total of 15 months).             Passed - Serum Potassium in past 12 months      Lab Results   Component Value Date    Potassium 4.3 05/20/2019             Passed - Serum Sodium in past 12 months      Lab Results   Component Value Date    Sodium 141 05/20/2019             Passed - Blood pressure on file in past 12 months     BP Readings from Last 1 Encounters:   05/20/19 138/78             Passed - Serum Creatinine in past 12 months      Creatinine   Date Value Ref Range Status   05/20/2019 0.80 0.70 - 1.30 mg/dL Final

## 2021-06-05 VITALS
BODY MASS INDEX: 33.27 KG/M2 | HEART RATE: 75 BPM | SYSTOLIC BLOOD PRESSURE: 116 MMHG | WEIGHT: 212 LBS | HEIGHT: 67 IN | OXYGEN SATURATION: 98 % | DIASTOLIC BLOOD PRESSURE: 74 MMHG

## 2021-06-05 VITALS
SYSTOLIC BLOOD PRESSURE: 124 MMHG | DIASTOLIC BLOOD PRESSURE: 74 MMHG | WEIGHT: 208.13 LBS | OXYGEN SATURATION: 98 % | HEIGHT: 67 IN | RESPIRATION RATE: 16 BRPM | BODY MASS INDEX: 32.67 KG/M2 | HEART RATE: 79 BPM

## 2021-06-05 VITALS
WEIGHT: 209 LBS | BODY MASS INDEX: 32.25 KG/M2 | DIASTOLIC BLOOD PRESSURE: 90 MMHG | HEART RATE: 86 BPM | SYSTOLIC BLOOD PRESSURE: 148 MMHG | RESPIRATION RATE: 16 BRPM | OXYGEN SATURATION: 97 %

## 2021-06-05 NOTE — TELEPHONE ENCOUNTER
Refill Approved    Rx renewed per Medication Renewal Policy. Medication was last renewed on 1/14/19.    Rika Nelson, [facility] Triage/Med Refill 2/6/2020     Requested Prescriptions   Pending Prescriptions Disp Refills     simvastatin (ZOCOR) 40 MG tablet 90 tablet 3     Sig: TAKE 1 TABLET BY MOUTH AT BEDTIME.       Statins Refill Protocol (Hmg CoA Reductase Inhibitors) Passed - 2/6/2020  8:32 AM        Passed - PCP or prescribing provider visit in past 12 months      Last office visit with prescriber/PCP: 10/29/2018 Tomasa Epperson MD OR same dept: Visit date not found OR same specialty: 10/29/2018 Tomasa Epperson MD  Last physical: 5/20/2019 Last MTM visit: Visit date not found   Next visit within 3 mo: Visit date not found  Next physical within 3 mo: Visit date not found  Prescriber OR PCP: Tomasa Epperson MD  Last diagnosis associated with med order: 1. Pure hypercholesterolemia  - simvastatin (ZOCOR) 40 MG tablet; TAKE 1 TABLET BY MOUTH AT BEDTIME.  Dispense: 90 tablet; Refill: 3    If protocol passes may refill for 12 months if within 3 months of last provider visit (or a total of 15 months).

## 2021-06-05 NOTE — TELEPHONE ENCOUNTER
FYI - Status Update  Who is Calling: Patient  Update: caller states I am totally out of medication. Please fill with high priority  Okay to leave a detailed message?:  Yes

## 2021-06-06 ENCOUNTER — COMMUNICATION - HEALTHEAST (OUTPATIENT)
Dept: FAMILY MEDICINE | Facility: CLINIC | Age: 61
End: 2021-06-06

## 2021-06-06 DIAGNOSIS — E53.9 VITAMIN B-COMPLEX DEFICIENCY: ICD-10-CM

## 2021-06-06 RX ORDER — CYANOCOBALAMIN 1000 UG/ML
1000 INJECTION, SOLUTION INTRAMUSCULAR; SUBCUTANEOUS
Qty: 3 ML | Refills: 3 | Status: SHIPPED | OUTPATIENT
Start: 2021-06-06 | End: 2021-10-06

## 2021-06-07 NOTE — PROGRESS NOTES
"The patient has been notified of following:     \"This telephone visit will be conducted via a call between you and your physician/provider. We have found that certain health care needs can be provided without the need for a physical exam.  This service lets us provide the care you need with a short phone conversation.  If a prescription is necessary we can send it directly to your pharmacy.  If lab work is needed we can place an order for that and you can then stop by our lab to have the test done at a later time.    If during the course of the call the physician/provider feels a telephone visit is not appropriate, you will not be charged for this service.\"     CC: Recheck hyperlipidemia and hypertension         History:  Patient requesting refill of medication.  Unable to come to the clinic due to coronavirus concerns.  Patient notes that he has had no chest pain or shortness of breath and has had no edema.  He has not been taking his blood pressure.  He is tolerating simvastatin without difficulty.  Review of old notes shows that his past cholesterol was 158.  Old notes reviewed.        Objective    Gen: Patient is alert, oriented            Assessment/Plan:  1. Pure hypercholesterolemia  Refill x90 days and patient will return in July for evaluation  - simvastatin (ZOCOR) 40 MG tablet; TAKE 1 TABLET BY MOUTH AT BEDTIME.  Dispense: 90 tablet; Refill: 0    2. Essential hypertension  Refill x90 days.  - hydroCHLOROthiazide (MICROZIDE) 12.5 mg capsule; TAKE 1 CAPSULE BY MOUTH EVERY DAY IN THE MORNING  Dispense: 90 capsule; Refill: 0    3. Vitamin B-complex deficiency  - cyanocobalamin 1,000 mcg/mL injection; Inject 1 mL (1,000 mcg total) into the shoulder, thigh, or buttocks every 30 (thirty) days.  Dispense: 3 mL; Refill: 3      Phone call duration:  15 minutes    Tomasa Epperson MD    "

## 2021-06-09 NOTE — TELEPHONE ENCOUNTER
RN cannot approve Refill Request    RN can NOT refill this medication Protocol failed and NO refill given.         Rika Nelson, [facility] Triage/Med Refill 6/18/2020    Requested Prescriptions   Pending Prescriptions Disp Refills     cyanocobalamin 1,000 mcg/mL injection [Pharmacy Med Name: CYANOCOBALAMIN 1,000 MCG/ML] 3 mL 3     Sig: INJECT 1 ML (1,000 MCG TOTAL) INTO THE SHOULDER, THIGH, OR BUTTOCKS EVERY 30 (THIRTY) DAYS.       Cyanocobalamin (Vitamin B12)  Refill Protocol Failed - 6/18/2020 12:09 PM        Failed - PCP or prescribing provider visit in past 12 months       Last office visit with prescriber/PCP: 10/29/2018 Tomasa Epperson MD OR same dept: Visit date not found OR same specialty: 10/29/2018 Tomasa Epperson MD Last physical: 5/20/2019 Last MTM visit: Visit date not found    Next visit within 3 mo: Visit date not found  Next physical within 3 mo: Visit date not found  Prescriber OR PCP: Tomasa Epperson MD  Last diagnosis associated with med order: 1. Vitamin B-complex deficiency  - cyanocobalamin 1,000 mcg/mL injection [Pharmacy Med Name: CYANOCOBALAMIN 1,000 MCG/ML]; Inject 1 mL (1,000 mcg total) into the shoulder, thigh, or buttocks every 30 (thirty) days.  Dispense: 3 mL; Refill: 3               Failed - Vitamin B12 level in last 12 months     Vitamin B-12   Date Value Ref Range Status   07/07/2017 382 213 - 816 pg/mL Final             Failed - CBC in last 12 months     WBC   Date Value Ref Range Status   05/20/2019 5.9 4.0 - 11.0 thou/uL Final     RBC   Date Value Ref Range Status   05/20/2019 4.82 4.40 - 6.20 mill/uL Final     Hemoglobin   Date Value Ref Range Status   05/20/2019 15.8 14.0 - 18.0 g/dL Final     Hematocrit   Date Value Ref Range Status   05/20/2019 46.7 40.0 - 54.0 % Final     MCV   Date Value Ref Range Status   05/20/2019 97 80 - 100 fL Final     MCH   Date Value Ref Range Status   05/20/2019 32.8 27.0 - 34.0 pg Final     MCHC   Date Value Ref Range  Status   05/20/2019 33.9 32.0 - 36.0 g/dL Final     RDW   Date Value Ref Range Status   05/20/2019 13.0 11.0 - 14.5 % Final     Platelets   Date Value Ref Range Status   05/20/2019 214 140 - 440 thou/uL Final     MPV   Date Value Ref Range Status   05/20/2019 7.7 7.0 - 10.0 fL Final

## 2021-06-09 NOTE — PROGRESS NOTES
"Norris Jolly is a 57-year-old with a history of hypertension hypercholesterolemia and previous colon cancer presents today for follow-up.  He is overall doing well he has had some increased joint and muscle soreness but he has also recently started an exercise program.  One of the aches and pains was in his right upper quadrant of his abdomen.  He isn't sure there is a change with meals or not.  He has had no other GI or  symptoms.  He has not tried to do her take anything for this.    He is now retired and he is having less stress and wear on his body.  He denies any chest pains or shortness of breath.  He does not usually monitoring blood pressures.  He is due for follow-up of his colon cancer in April.  We reviewed heart disease prevention and cancer detection.    Objective:  Visit Vitals     /72     Pulse 78     Resp 16     Ht 5' 9\" (1.753 m)     Wt 206 lb (93.4 kg)     BMI 30.42 kg/m2     Neck is supple without lymphadenopathy thyromegaly or bruits lungs are clear heart with a regular rate and rhythm without murmur or gallop normal S1-S2 back is nontender no CVA tenderness abdomen is soft nontender bowel sounds positive no hepatosplenomegaly or other masses extremities are nontender without edema skin appears normal he has good range of motion of his back and upper and lower extremities only minor discomfort and no right upper quadrant abdominal pain.    Labs pending    Assessment: Abdominal pain with underlying hypertension hypercholesterolemia previous colon cancer    Plan: We will call with labs when available, I encouraged him on his exercise program diet and weight loss, he is to follow up here in 6 months or as needed we discussed the idea of doing further evaluation with abdominal CT if his abdominal symptoms persist without explanation.  We discussed how it could be associated with his Zocor as well.    "

## 2021-06-10 NOTE — TELEPHONE ENCOUNTER
Refill Approved    Rx renewed per Medication Renewal Policy. Medication was last renewed on 5/5/20.    Rika Nelson, [facility] Triage/Med Refill 8/12/2020     Requested Prescriptions   Pending Prescriptions Disp Refills     simvastatin (ZOCOR) 40 MG tablet [Pharmacy Med Name: SIMVASTATIN 40 MG TABLET] 90 tablet 0     Sig: TAKE 1 TABLET BY MOUTH EVERYDAY AT BEDTIME       Statins Refill Protocol (Hmg CoA Reductase Inhibitors) Passed - 8/11/2020 11:48 AM        Passed - PCP or prescribing provider visit in past 12 months      Last office visit with prescriber/PCP: 10/29/2018 Tomasa Epperson MD OR same dept: Visit date not found OR same specialty: 10/29/2018 Tomasa Epperson MD  Last physical: 5/20/2019 Last MTM visit: Visit date not found   Next visit within 3 mo: Visit date not found  Next physical within 3 mo: Visit date not found  Prescriber OR PCP: Tomasa Epperson MD  Last diagnosis associated with med order: 1. Pure hypercholesterolemia  - simvastatin (ZOCOR) 40 MG tablet [Pharmacy Med Name: SIMVASTATIN 40 MG TABLET]; TAKE 1 TABLET BY MOUTH EVERYDAY AT BEDTIME  Dispense: 90 tablet; Refill: 0    If protocol passes may refill for 12 months if within 3 months of last provider visit (or a total of 15 months).

## 2021-06-11 NOTE — PROGRESS NOTES
"Norris Jolly is a 57-year-old with history of previous colon cancer hypertension hypercholesterolemia as well as history of previous small bowel obstruction who presents today for follow-up.  He has been having intermittent abdominal pains.  He describes this as relatively mild but dull and near constant mostly into his right upper quadrant area.  He has had no nausea vomiting his bowel movements have been normal he has had no melena hematochezia he has had no  symptoms he is not sure about any triggers.  We discussed his recent evaluation colonoscopies and likely etiologies.  Past medical history reviewed    He complains of a sense of right foot numbness and has had a previous knee surgery.  He does have some sense of numbness into his other foot as well as his hands at times.  This seems more sporadic whereas his right foot is more constant.  He does have some low back pain but no other neurosensory change.    Objective:/80  Pulse 82  Ht 5' 9\" (1.753 m)  Wt 205 lb (93 kg)  SpO2 95%  BMI 30.27 kg/m2  Was not gentleman in no obvious distress  Neck supple without lymphadenopathy lungs are clear heart with a regular rate and rhythm without murmur back is nontender no CVA tenderness abdomen is soft bowel sounds positive there is some very mild right upper quadrant tenderness without rebound guarding or mass skin appears normal lower extremities are free of edema, pulses seem full and symmetrical    Labs pending    Assessment: Right upper quadrant abdominal pain with history of previous small bowel obstruction and colon cancer vitamin B12 deficiency history of extremity numbness?  Peripheral neuropathy with underlying hypertension hypercholesterolemia    Plan: We will call with labs when available we discussed the idea of doing an abdominal CT to further evaluate his symptoms assuming labs are negative, discussed the idea of further evaluation of his numbness with neurological assessment depending on his " symptoms and he will follow-up here otherwise as needed or in 6 month

## 2021-06-12 NOTE — PROGRESS NOTES
S:  Norris presents for recheck of his chronic medical issues.  His hypertension is fairly well controlled but still running higher than he would like.    He continues to have right upper quadrant fullness and tenderness that has been plaguing him for the past year or so.  ROS: Negative for vomiting diarrhea or nausea.    Hyperlipidemia remained stable on statin    Medications: Aspirin 81 mg daily, B12, hydrochlorthiazide 12.5 mg daily, Viagra as needed, Zocor 40 mg daily, multivitamin    O:   Blood pressure 148/90, pulse 86 respiration 16 weight 209  Constitutional:    --Vitals as above  --No acute distress  Eyes-  --Sclera noninjected  --Lids and conjunctiva normal  ENT-  --TMs clear  --Sclera noninjected  --Pharynx not erythematous  Neck-  --Neck supple with no cervical lymphadenopathy  Lungs-  --Clear to Auscultation  Heart-  --Regular rate and rhythm  Abdomen--  --Soft, non-tender, non-distended  Skin-  --Pink and dry  Psych-  --Alert and oriented  --Normal affect      A:   Hypertension  Hyperlipidemia  Status post colon cancer    P:   Continue statin  Continue hydrochlorothiazide  We will add lisinopril 10 mg and discussed side effects  Return to clinic in 4 weeks for recheck of blood pressure  CBC BMP PSA lipid  Flu shot today

## 2021-06-13 NOTE — TELEPHONE ENCOUNTER
Refill Approved    Rx renewed per Medication Renewal Policy. Medication was last renewed on 5/5/20.    Rika Nelson, [facility] Triage/Med Refill 11/19/2020     Requested Prescriptions   Pending Prescriptions Disp Refills     hydroCHLOROthiazide (MICROZIDE) 12.5 mg capsule [Pharmacy Med Name: HYDROCHLOROTHIAZIDE 12.5 MG CP] 90 capsule 0     Sig: TAKE 1 CAPSULE BY MOUTH EVERY DAY IN THE MORNING       Diuretics/Combination Diuretics Refill Protocol  Passed - 11/18/2020 12:49 PM        Passed - Visit with PCP or prescribing provider visit in past 12 months     Last office visit with prescriber/PCP: 10/20/2020 Tomasa Epperson MD OR same dept: 10/20/2020 Tomasa Epperson MD OR same specialty: 10/20/2020 Tomasa Epperson MD  Last physical: 5/20/2019 Last MTM visit: Visit date not found   Next visit within 3 mo: Visit date not found  Next physical within 3 mo: Visit date not found  Prescriber OR PCP: Tomasa Epperson MD  Last diagnosis associated with med order: 1. Essential hypertension  - hydroCHLOROthiazide (MICROZIDE) 12.5 mg capsule [Pharmacy Med Name: HYDROCHLOROTHIAZIDE 12.5 MG CP]; TAKE 1 CAPSULE BY MOUTH EVERY DAY IN THE MORNING  Dispense: 90 capsule; Refill: 0    If protocol passes may refill for 12 months if within 3 months of last provider visit (or a total of 15 months).             Passed - Serum Potassium in past 12 months      Lab Results   Component Value Date    Potassium 4.6 10/20/2020             Passed - Serum Sodium in past 12 months      Lab Results   Component Value Date    Sodium 140 10/20/2020             Passed - Blood pressure on file in past 12 months     BP Readings from Last 1 Encounters:   10/20/20 148/90             Passed - Serum Creatinine in past 12 months      Creatinine   Date Value Ref Range Status   10/20/2020 0.80 0.70 - 1.30 mg/dL Final

## 2021-06-13 NOTE — TELEPHONE ENCOUNTER
Refill Approved    Rx renewed per Medication Renewal Policy. Medication was last renewed on 10/20/20, last OV 10/20/20.    Drea Fang, [facility] Triage/Med Refill 11/15/2020     Requested Prescriptions   Pending Prescriptions Disp Refills     lisinopriL (PRINIVIL,ZESTRIL) 10 MG tablet [Pharmacy Med Name: LISINOPRIL 10 MG TABLET] 30 tablet 0     Sig: TAKE 1 TABLET BY MOUTH EVERY DAY       Ace Inhibitors Refill Protocol Passed - 11/11/2020 12:32 PM        Passed - PCP or prescribing provider visit in past 12 months       Last office visit with prescriber/PCP: 10/20/2020 Tomasa Epperson MD OR same dept: 10/20/2020 Tomasa Epperson MD OR same specialty: 10/20/2020 Tomasa Epperson MD  Last physical: 5/20/2019 Last MTM visit: Visit date not found   Next visit within 3 mo: Visit date not found  Next physical within 3 mo: Visit date not found  Prescriber OR PCP: Tomasa Epperson MD  Last diagnosis associated with med order: 1. Hypertension, unspecified type  - lisinopriL (PRINIVIL,ZESTRIL) 10 MG tablet [Pharmacy Med Name: LISINOPRIL 10 MG TABLET]; TAKE 1 TABLET BY MOUTH EVERY DAY  Dispense: 30 tablet; Refill: 0    If protocol passes may refill for 12 months if within 3 months of last provider visit (or a total of 15 months).             Passed - Serum Potassium in past 12 months     Lab Results   Component Value Date    Potassium 4.6 10/20/2020             Passed - Blood pressure filed in past 12 months     BP Readings from Last 1 Encounters:   10/20/20 148/90             Passed - Serum Creatinine in past 12 months     Creatinine   Date Value Ref Range Status   10/20/2020 0.80 0.70 - 1.30 mg/dL Final

## 2021-06-13 NOTE — PROGRESS NOTES
Preoperative Exam    Scheduled Procedure: Endoscopy  Surgery Date:  12/3/2020  Surgery Location: [facility] [phone], [phone]    Surgeon:  Dr. Novak [facility]    Norris Jolly is a 60-year-old male with history of colon cancer (status post partial colectomy), hypertension, hypercholesterolemia who presents for preop evaluation for upper endoscopy scheduled 12/3/2020.    Denies personal or family history of complications from surgery or anesthesia.  Denies family history of malignant hyperthermia.  Denies chest pain or shortness of breath.  He has no history of ROSE MARY, does not have contact lenses or dental appliances, and is a Mallampati class II.      Assessment/Plan:     1. Pre-op exam  - HM2(CBC w/o Differential)  - Basic Metabolic Panel     2.  Hypertension, stable on lisinopril and HCTZ  -On aspirin 81 mg daily    Surgical Procedure Risk: Low (reported cardiac risk generally < 1%)  Have you had prior anesthesia?: Yes  Have you or any family members had a previous anesthesia reaction:  No  Do you or any family members have a history of a clotting or bleeding disorder?: No  Cardiac Risk Assessment: no increased risk for major cardiac complications    Pt has been optimized for surgery      Functional Status: Independent  Patient plans to recover at home with family.     Subjective:      Norris Jolly is a 60 y.o. male who presents for a preoperative consultation.      All other systems reviewed and are negative, other than those listed in the HPI.    Pertinent History  Do you have difficulty breathing or chest pain after walking up a flight of stairs: No  History of obstructive sleep apnea: No  Steroid use in the last 6 months: No  Frequent Aspirin/NSAID use: No  Prior Blood Transfusion: No  Prior Blood Transfusion Reaction: No    Current Outpatient Medications   Medication Sig Dispense Refill     aspirin 81 mg chewable tablet Chew 81 mg daily.       cyanocobalamin 1,000 mcg/mL injection INJECT 1 ML  "(1,000 MCG TOTAL) INTO THE SHOULDER, THIGH, OR BUTTOCKS EVERY 30 (THIRTY) DAYS. 3 mL 3     hydroCHLOROthiazide (MICROZIDE) 12.5 mg capsule TAKE 1 CAPSULE BY MOUTH EVERY DAY IN THE MORNING 90 capsule 3     lisinopriL (PRINIVIL,ZESTRIL) 10 MG tablet Take 1 tablet (10 mg total) by mouth daily. 90 tablet 3     multivitamin therapeutic (THERAGRAN) tablet Take 1 tablet by mouth daily.  0     sildenafil (VIAGRA) 100 MG tablet Take 1 tablet (100 mg total) by mouth daily as needed for erectile dysfunction. 16 tablet 3     simvastatin (ZOCOR) 40 MG tablet TAKE 1 TABLET BY MOUTH EVERYDAY AT BEDTIME 90 tablet 2     SYRINGE, DISPOSABLE, MISC Syringe 21G X 1-1/2\" 3 ML Miscellaneous     Use with B12 shots qmonth       No current facility-administered medications for this visit.         No Known Allergies    Patient Active Problem List   Diagnosis     Alcohol Abuse     Vitamin B12 Deficiency     Adenocarcinoma Of The Ascending Colon     Essential Hypercholesterolemia     Essential Hypertension     Male Erectile Disorder Due To Physical Condition     Small bowel obstruction (H)     Colon cancer (H)     Generalized abdominal pain     Hypokalemia       Past Medical History:   Diagnosis Date     Colon cancer (H)      Hypertension        Past Surgical History:   Procedure Laterality Date     COLON SURGERY         Social History     Socioeconomic History     Marital status:      Spouse name: Not on file     Number of children: Not on file     Years of education: Not on file     Highest education level: Not on file   Occupational History     Not on file   Social Needs     Financial resource strain: Not on file     Food insecurity     Worry: Not on file     Inability: Not on file     Transportation needs     Medical: Not on file     Non-medical: Not on file   Tobacco Use     Smoking status: Former Smoker     Smokeless tobacco: Former User     Quit date: 3/25/1985   Substance and Sexual Activity     Alcohol use: Yes     Drug use: No " "    Sexual activity: Yes     Partners: Female   Lifestyle     Physical activity     Days per week: Not on file     Minutes per session: Not on file     Stress: Not on file   Relationships     Social connections     Talks on phone: Not on file     Gets together: Not on file     Attends Orthodox service: Not on file     Active member of club or organization: Not on file     Attends meetings of clubs or organizations: Not on file     Relationship status: Not on file     Intimate partner violence     Fear of current or ex partner: Not on file     Emotionally abused: Not on file     Physically abused: Not on file     Forced sexual activity: Not on file   Other Topics Concern     Not on file   Social History Narrative     Not on file       ROS:  10 pt system review complete.  Notable for chronic, dull, minimal right upper quadrant pain.     Patient Care Team:  Tomasa Epperson MD as PCP - General (Family Medicine)  Tomasa Epperson MD as Assigned PCP          Objective:     Vitals:    12/02/20 1108   BP: 124/74   Pulse: 79   Resp: 16   SpO2: 98%   Weight: 208 lb 2 oz (94.4 kg)   Height: 5' 7\" (1.702 m)         Physical Exam:  General exam: Awake, alert, appears comfortable, no acute distress.  Head: Normocephalic, atraumatic.  Eyes: No conjunctival injection, EOMI.  Nose: Nares patent, no discharge.  Mouth and throat: Dentition intact.. Uvula midline, mucous membranes moist, no posterior oropharyngeal erythema.  Mallampati grade 2  Neck: Supple, trache midline.   Cardiovascular: Regular rate and rhythm.  No murmurs, gallops, or rubs.  Pulmonary: Lungs are clear to auscultation bilaterally.  Gastrointestinal: Abdomen is soft, nontender, and nondistended.  Integumentary: Skin is warm, dry, intact.  Musculoskeletal: Moving all extremities.  Neurologic: Awake, alert, and oriented x3. Cranial nerves grossly intact.     There are no Patient Instructions on file for this visit.    Labs:  No results found for this or " any previous visit (from the past 24 hour(s)).    Immunization History   Administered Date(s) Administered     DT (pediatric) 12/14/2005     INFLUENZA,RECOMBINANT,INJ,PF QUADRIVALENT 18+YRS 10/20/2020     INFLUENZA,SEASONAL QUAD, PF, =/> 6months 10/29/2018     Td,adult,historic,unspecified 12/14/2005     Tdap 05/20/2019       Thank you for the opportunity to participate in the care for this patient.  If you have any concerns please do not hesitate to contact me at the [facility] at [phone].    Electronically signed by Tomasa Epperson MD 12/02/20 11:12 AM

## 2021-06-14 NOTE — PROGRESS NOTES
"Chief Complaint   Patient presents with     Establish Care       HPI: Norris very pleasant retired  presents today for discussion of his chronic medical issues.  He had colon cancer about 15 years ago and had a colon resection.  Since that time he has had mild bowel irritation and letters from the gastroenterology on April 2017 reviewed showing possible anastomotic irritation.  It has not caused him problems enough to have surgery considered.  He continues to take B12 for his supplementation.  His hypertension is been stable hyperlipidemia is been stable and he continues to use Viagra for erectile dysfunction.    ROS: No chest pain or shortness of breath or syncope.    SH: The Patient's  reports that he has quit smoking. He quit smokeless tobacco use about 32 years ago. He reports that he drinks alcohol. He reports that he does not use illicit drugs.     FH: The Patient's family history includes Hyperlipidemia in his father; Hypertension in his father.    Meds:  Norris has a current medication list which includes the following prescription(s): aspirin, cyanocobalamin, hydrochlorothiazide, multivitamin therapeutic, sildenafil, simvastatin, and syringe, disposable.    O:  /80  Pulse 84  Resp 16  Ht 5' 9\" (1.753 m)  Wt 212 lb (96.2 kg)  SpO2 98%  BMI 31.31 kg/m2     Lungs--Clear to Auscultation  Heart--Regular rate and rhythm  Abdomen--Soft, non-tender, non-distended  Skin-Pink and dry     A/P:   1. Essential Hypercholesterolemia  Continue statin    2. Essential hypertension  Continue HIDA chlorothiazide    3. Adenocarcinoma Of The Ascending Colon  Follow-up with [facility] as needed    4. Male Erectile Disorder Due To Physical Condition  Continue Viagra    See back in 6 months                                          "

## 2021-06-14 NOTE — PROGRESS NOTES
Surgical Information:  Surgery/Procedure: Cholecystectomy  Surgery Location: [facility]  Surgeon: Dr. Abhay Sewell  Surgery Date: 2/8/21  Time of Surgery: 7am  Where patient plans to recover: At home with family  Fax number for surgical facility: [phone].    Very pleasant 60-year-old gentleman with a past medical history of colon cancer with resection, presents for preop evaluation for cholecystectomy on 2/8/2021 by Dr. Em.  He has had longstanding right upper quadrant fullness and pain has been plaguing him for over a year.  Is associate with nausea but no vomiting.    Family history is negative for malignant hyperthermia, he is not taking blood thinners, he does not have ROSE MARY, he has no dental appliances or contact lenses, he has had no chest pain or cardiac issues, and he is a Mallampati class III.    Assessment/Plan:     1. Pre-op exam  Patient is been optimized for surgery  - HM2(CBC w/o Differential)  - Comprehensive Metabolic Panel     2.  Hypertension  -Stable.  Resume lisinopril and HCTZ postoperatively.    Surgical Procedure Risk: Low (reported cardiac risk generally < 1%)    Pt has been optimized for surgery    Functional Status: Independent  Patient plans to recover at home with family.     Subjective:      Norris Jolly is a 60 y.o. male who presents for a preoperative consultation.    ROS is negative except for over nourishment and erectile dysfunction    Current Outpatient Medications   Medication Sig Dispense Refill     aspirin 81 mg chewable tablet Chew 81 mg daily.       cyanocobalamin 1,000 mcg/mL injection INJECT 1 ML (1,000 MCG TOTAL) INTO THE SHOULDER, THIGH, OR BUTTOCKS EVERY 30 (THIRTY) DAYS. 3 mL 3     hydroCHLOROthiazide (MICROZIDE) 12.5 mg capsule TAKE 1 CAPSULE BY MOUTH EVERY DAY IN THE MORNING 90 capsule 3     lisinopriL (PRINIVIL,ZESTRIL) 10 MG tablet Take 1 tablet (10 mg total) by mouth daily. 90 tablet 3     multivitamin therapeutic (THERAGRAN) tablet Take 1 tablet by  "mouth daily.  0     sildenafiL (VIAGRA) 100 MG tablet Take 1 tablet (100 mg total) by mouth daily as needed for erectile dysfunction. 16 tablet 3     simvastatin (ZOCOR) 40 MG tablet TAKE 1 TABLET BY MOUTH EVERYDAY AT BEDTIME 90 tablet 2     SYRINGE, DISPOSABLE, MISC Syringe 21G X 1-1/2\" 3 ML Miscellaneous     Use with B12 shots qmonth       No current facility-administered medications for this visit.         No Known Allergies    Patient Active Problem List   Diagnosis     Alcohol Abuse     Vitamin B12 Deficiency     Adenocarcinoma Of The Ascending Colon     Essential Hypercholesterolemia     Essential Hypertension     Male Erectile Disorder Due To Physical Condition     Small bowel obstruction (H)     Colon cancer (H)     Generalized abdominal pain     Hypokalemia       Past Medical History:   Diagnosis Date     Colon cancer (H)      Hypertension        Past Surgical History:   Procedure Laterality Date     COLON SURGERY         Social History     Socioeconomic History     Marital status:      Spouse name: Not on file     Number of children: Not on file     Years of education: Not on file     Highest education level: Not on file   Occupational History     Not on file   Social Needs     Financial resource strain: Not on file     Food insecurity     Worry: Not on file     Inability: Not on file     Transportation needs     Medical: Not on file     Non-medical: Not on file   Tobacco Use     Smoking status: Former Smoker     Smokeless tobacco: Former User     Quit date: 3/25/1985   Substance and Sexual Activity     Alcohol use: Yes     Drug use: No     Sexual activity: Yes     Partners: Female   Lifestyle     Physical activity     Days per week: Not on file     Minutes per session: Not on file     Stress: Not on file   Relationships     Social connections     Talks on phone: Not on file     Gets together: Not on file     Attends Yarsani service: Not on file     Active member of club or organization: Not on " "file     Attends meetings of clubs or organizations: Not on file     Relationship status: Not on file     Intimate partner violence     Fear of current or ex partner: Not on file     Emotionally abused: Not on file     Physically abused: Not on file     Forced sexual activity: Not on file   Other Topics Concern     Not on file   Social History Narrative     Not on file       Patient Care Team:  Tomasa Epperson MD as PCP - General (Family Medicine)  Tomasa Epperson MD as Assigned PCP          Objective:     Vitals:    01/26/21 1122   BP: 116/74   Pulse: 75   SpO2: 98%   Weight: 212 lb (96.2 kg)   Height: 5' 7\" (1.702 m)         Physical Exam:  /74   Pulse 75   Ht 5' 7\" (1.702 m)   Wt 212 lb (96.2 kg)   SpO2 98%   BMI 33.20 kg/m    There are no Patient Instructions on file for this visit.   Constitutional:    --Vitals as above  --No acute distress  Eyes-  --Sclera noninjected  --Lids and conjunctiva normal  ENT-  --TMs clear  --Sclera noninjected  --Pharynx not erythematous  Neck-  --Neck supple with no cervical lymphadenopathy  Lungs-  --Clear to Auscultation  Heart-  --Regular rate and rhythm  Abdomen--  --Soft, non-tender, non-distended  Skin-  --Pink and dry  Psych-  --Alert and oriented  --Normal affect    Labs:  Labs pending at this time.  Results will be reviewed when available.    Immunization History   Administered Date(s) Administered     DT (pediatric) 12/14/2005     INFLUENZA,RECOMBINANT,INJ,PF QUADRIVALENT 18+YRS 10/20/2020     INFLUENZA,SEASONAL QUAD, PF, =/> 6months 10/29/2018     Td,adult,historic,unspecified 12/14/2005     Tdap 05/20/2019       Thank you for the opportunity to participate in the care for this patient.  If you have any concerns please do not hesitate to contact me at the [facility] at [phone].      Tomasa Epperson MD  [facility]    "

## 2021-06-17 NOTE — TELEPHONE ENCOUNTER
Refill Approved    Rx renewed per Medication Renewal Policy. Medication was last renewed on 8/12/20.    Abhay Edmonds, [facility] Triage/Med Refill 5/12/2021     Requested Prescriptions   Pending Prescriptions Disp Refills     simvastatin (ZOCOR) 40 MG tablet [Pharmacy Med Name: SIMVASTATIN 40 MG TABLET] 90 tablet 2     Sig: TAKE 1 TABLET BY MOUTH EVERYDAY AT BEDTIME       Statins Refill Protocol (Hmg CoA Reductase Inhibitors) Passed - 5/12/2021 12:22 AM        Passed - PCP or prescribing provider visit in past 12 months      Last office visit with prescriber/PCP: 10/20/2020 Tomasa Epperson MD OR same dept: 10/20/2020 Tomasa Epperson MD OR same specialty: 10/20/2020 Tomasa Epeprson MD  Last physical: 1/26/2021 Last MTM visit: Visit date not found   Next visit within 3 mo: Visit date not found  Next physical within 3 mo: Visit date not found  Prescriber OR PCP: Tomasa Epperson MD  Last diagnosis associated with med order: 1. Pure hypercholesterolemia  - simvastatin (ZOCOR) 40 MG tablet [Pharmacy Med Name: SIMVASTATIN 40 MG TABLET]; TAKE 1 TABLET BY MOUTH EVERYDAY AT BEDTIME  Dispense: 90 tablet; Refill: 2    If protocol passes may refill for 12 months if within 3 months of last provider visit (or a total of 15 months).

## 2021-06-18 NOTE — PROGRESS NOTES
"Chief Complaint   Patient presents with      Annual exam            HPI: Norris presents for recheck.  He is doing well other than occasional aches and pains.  No bowel or bladder issues from his previous anastomosis.  Continues on Viagra, Zocor, hydrochlorothiazide, and baby aspirin.    ROS: No chest pain or shortness of breath or erectile dysfunction.    SH:    reports that he has quit smoking. He quit smokeless tobacco use about 33 years ago. He reports that he drinks alcohol. He reports that he does not use illicit drugs.      FH: The Patient's family history includes Hyperlipidemia in his father; Hypertension in his father.     Meds:  Norris has a current medication list which includes the following prescription(s): aspirin, cyanocobalamin, hydrochlorothiazide, multivitamin therapeutic, sildenafil, simvastatin, and syringe, disposable.    O:  /78  Pulse 84  Temp 98.6  F (37  C)  Resp 16  Ht 5' 9\" (1.753 m)  Wt 217 lb 6 oz (98.6 kg)  SpO2 97%  BMI 32.1 kg/m2     Lungs--Clear to Auscultation  Heart--Regular rate and rhythm  Abdomen--Soft, non-tender, non-distended  Skin-Pink and dry     A/P:   1. Essential Hypercholesterolemia  - Comprehensive Metabolic Panel  - Lipid Cascade  -Continue statin    2. Essential hypertension  -Continue HIDA chlorothiazide  - HM2(CBC w/o Differential)    3. Male Erectile Disorder Due To Physical Condition  -Continue Viagra    4. Wellness examination  -Encouraged exercise and weight loss  - PSA (Prostatic-Specific Antigen), Annual Screen    Return in 6 months                                      "

## 2021-06-19 NOTE — LETTER
Letter by Tomasa Epperson MD at      Author: Tomasa Epperson MD Service: -- Author Type: --    Filed:  Encounter Date: 5/20/2019 Status: (Other)         Norris Jolly  [address] [place] [address]            May 20, 2019        Dear Mr. Jolly,        Below are the results from your recent visit:    Resulted Orders   HM2(CBC w/o Differential)   Result Value Ref Range    WBC 5.9 4.0 - 11.0 thou/uL    RBC 4.82 4.40 - 6.20 mill/uL    Hemoglobin 15.8 14.0 - 18.0 g/dL    Hematocrit 46.7 40.0 - 54.0 %    MCV 97 80 - 100 fL    MCH 32.8 27.0 - 34.0 pg    MCHC 33.9 32.0 - 36.0 g/dL    RDW 13.0 11.0 - 14.5 %    Platelets 214 140 - 440 thou/uL    MPV 7.7 7.0 - 10.0 fL   Basic Metabolic Panel   Result Value Ref Range    Sodium 141 136 - 145 mmol/L    Potassium 4.3 3.5 - 5.0 mmol/L    Chloride 106 98 - 107 mmol/L    CO2 24 22 - 31 mmol/L    Anion Gap, Calculation 11 5 - 18 mmol/L    Glucose 106 70 - 125 mg/dL    Calcium 10.2 8.5 - 10.5 mg/dL    BUN 11 8 - 22 mg/dL    Creatinine 0.80 0.70 - 1.30 mg/dL    GFR MDRD Af Amer >60 >60 mL/min/1.73m2    GFR MDRD Non Af Amer >60 >60 mL/min/1.73m2    Narrative    Fasting Glucose reference range is 70-99 mg/dL per  American Diabetes Association (ADA) guidelines.   PSA (Prostatic-Specific Antigen), Annual Screen   Result Value Ref Range    PSA 0.4 0.0 - 4.5 ng/mL    Narrative    Method is Abbott Prostate-Specific Antigen (PSA)  Standard-WHO 1st International (90:10)   Lipid Cascade   Result Value Ref Range    Cholesterol 158 <=199 mg/dL    Triglycerides 143 <=149 mg/dL    HDL Cholesterol 51 >=40 mg/dL    LDL Calculated 78 <=129 mg/dL    Patient Fasting > 8hrs? Yes    Glycosylated Hemoglobin A1c   Result Value Ref Range    Hemoglobin A1c 5.6 3.5 - 6.0 %         Norris, your laboratory results are normal.  That is good news.  Thank you for coming in to visit.  We should visit again in 1 year.      Sincerely,        BHARAT Epperson MD, MICKI Dsouza  [facility]  [phone]

## 2021-06-21 NOTE — LETTER
Anesthesia Post Evaluation    Patient: Winnie Gillespie    Letter by Tomasa Epperson MD at      Author: Tomasa Epperson MD Service: -- Author Type: --    Filed:  Encounter Date: 10/21/2020 Status: (Other)         Norris Jolly  [address] [place] [address]            October 21, 2020        Dear Mr. Jolly,        Below are the results from your recent visit:    Resulted Orders   HM2(CBC w/o Differential)   Result Value Ref Range    WBC 5.4 4.0 - 11.0 thou/uL    RBC 5.21 4.40 - 6.20 mill/uL    Hemoglobin 15.6 14.0 - 18.0 g/dL    Hematocrit 46.2 40.0 - 54.0 %    MCV 89 80 - 100 fL    MCH 30.0 27.0 - 34.0 pg    MCHC 33.8 32.0 - 36.0 g/dL    RDW 13.7 11.0 - 14.5 %    Platelets 193 140 - 440 thou/uL    MPV 7.3 7.0 - 10.0 fL   Basic Metabolic Panel   Result Value Ref Range    Sodium 140 136 - 145 mmol/L    Potassium 4.6 3.5 - 5.0 mmol/L    Chloride 103 98 - 107 mmol/L    CO2 27 22 - 31 mmol/L    Anion Gap, Calculation 10 5 - 18 mmol/L    Glucose 105 70 - 125 mg/dL    Calcium 9.8 8.5 - 10.5 mg/dL    BUN 9 8 - 22 mg/dL    Creatinine 0.80 0.70 - 1.30 mg/dL    GFR MDRD Af Amer >60 >60 mL/min/1.73m2    GFR MDRD Non Af Amer >60 >60 mL/min/1.73m2    Narrative    Fasting Glucose reference range is 70-99 mg/dL per  American Diabetes Association (ADA) guidelines.   Lipid Cascade   Result Value Ref Range    Cholesterol 198 <=199 mg/dL    Triglycerides 121 <=149 mg/dL    HDL Cholesterol 69 >=40 mg/dL    LDL Calculated 105 <=129 mg/dL    Patient Fasting > 8hrs? Yes    PSA (Prostatic-Specific Antigen), Annual Screen   Result Value Ref Range    PSA 1.0 0.0 - 4.5 ng/mL    Narrative    Method is Abbott Prostate-Specific Antigen (PSA)  Standard-WHO 1st International (90:10)         Norris, your labs look good.  Let's visit in 4 weeks and recheck your blood pressure.    Sincerely,        BHARAT Epperson MD, MICKI  [facility]  [phone]

## 2021-06-21 NOTE — PROGRESS NOTES
"Chief Complaint   Patient presents with     Dizziness     Pt c/o weak and dizzy, arms and legs are feeling strange/numblike x 5 days, he gave blood last wednesday he's not sure if its related       HPI: Norris presents today feeling \"weak and tired\".  Been going on for 1 week and duration, is mild, and he stated that occasionally he gets a little bit of a \"twinging\" in his left chest that lasts for a short period of time.  This is in the context of having had colon cancer.    ROS: No dyspnea.  No syncopal episode.  No vomiting diarrhea or nausea.    SH:    reports that he has quit smoking. He quit smokeless tobacco use about 33 years ago. He reports that he drinks alcohol. He reports that he does not use illicit drugs.      FH: The Patient's family history includes Hyperlipidemia in his father; Hypertension in his father.     Meds:  Norris has a current medication list which includes the following prescription(s): aspirin, cyanocobalamin, hydrochlorothiazide, multivitamin therapeutic, sildenafil, simvastatin, and syringe, disposable.    O:  /80  Pulse 82  Temp 98.7  F (37.1  C)  Resp 16  Wt 211 lb 5 oz (95.9 kg)  SpO2 98%  BMI 31.21 kg/m2   Constitutional:    --Vitals as above  --No acute distress  Eyes-  --Sclera noninjected  --Lids and conjunctiva normal  ENT-  --TMs clear  --Sclera noninjected  --Pharynx not erythematous  Neck-  --Neck supple with no cervical lymphadenopathy  Lungs-  --Clear to Auscultation  Heart-  --Regular rate and rhythm  Abdomen--  --Soft, non-tender, non-distended  Skin-  --Pink and dry  Psych-  --Alert and oriented  --Normal affect    EKG: Normal sinus rhythm with ventricular rate of approximately 70 with no ST or T wave deviations      A/P:   1. Acute chest pain  There is no evidence of cardiac involvement.  Given his minimal pain that is short-lived and not associated with exertion and negative EKG I am not concerned about cardiac cause  - Electrocardiogram Perform - Clinic  - " HM2(CBC w/o Differential)  - Basic Metabolic Panel    2. Generalized muscle weakness  We discussed his recent schedule and activity.  He has been working quite a few hours as well as helping his daughter move and he feels this may have caused him to be somewhat tired and fatigued.  He would like to monitor the situation and if getting worse let me know.  - HM2(CBC w/o Differential)  - Basic Metabolic Panel    3.  Wellness  -Update flu shot today

## 2021-06-23 NOTE — TELEPHONE ENCOUNTER
Refill Approved    Rx renewed per Medication Renewal Policy. Medication was last renewed on 12/11/2017.    Olivia Kaiser, [facility] Triage/Med Refill 1/14/2019     Requested Prescriptions   Pending Prescriptions Disp Refills     simvastatin (ZOCOR) 40 MG tablet 90 tablet 3     Sig: TAKE 1 TABLET BY MOUTH AT BEDTIME.    Statins Refill Protocol (Hmg CoA Reductase Inhibitors) Passed - 1/14/2019 11:58 AM       Passed - PCP or prescribing provider visit in past 12 months     Last office visit with prescriber/PCP: 10/29/2018 Tomasa Epperson MD OR same dept: 10/29/2018 Tomasa Epperson MD OR same specialty: 10/29/2018 Tomasa Epperson MD  Last physical: Visit date not found Last MTM visit: Visit date not found   Next visit within 3 mo: Visit date not found  Next physical within 3 mo: Visit date not found  Prescriber OR PCP: Tomasa Epperson MD  Last diagnosis associated with med order: 1. Pure hypercholesterolemia  - simvastatin (ZOCOR) 40 MG tablet; TAKE 1 TABLET BY MOUTH AT BEDTIME.  Dispense: 90 tablet; Refill: 3    If protocol passes may refill for 12 months if within 3 months of last provider visit (or a total of 15 months).

## 2021-06-25 NOTE — TELEPHONE ENCOUNTER
RN cannot approve Refill Request    RN can NOT refill this medication PCP messaged that patient is overdue for Labs. Last office visit: 10/20/2020 Tomasa Epperson MD Last Physical: 1/26/2021 Last MTM visit: Visit date not found Last visit same specialty: 10/20/2020 Tomasa Epperson MD.  Next visit within 3 mo: Visit date not found  Next physical within 3 mo: Visit date not found      Drea Fang, [facility] Triage/Med Refill 6/6/2021    Requested Prescriptions   Pending Prescriptions Disp Refills     cyanocobalamin 1,000 mcg/mL injection [Pharmacy Med Name: CYANOCOBALAMIN 1,000 MCG/ML] 3 mL 3     Sig: INJECT 1 ML (1,000 MCG TOTAL) INTO THE SHOULDER, THIGH, OR BUTTOCKS EVERY 30 (THIRTY) DAYS.       Cyanocobalamin (Vitamin B12)  Refill Protocol Failed - 6/6/2021 12:34 AM        Failed - Vitamin B12 level in last 12 months     Vitamin B-12   Date Value Ref Range Status   07/07/2017 382 213 - 816 pg/mL Final             Passed - PCP or prescribing provider visit in past 12 months       Last office visit with prescriber/PCP: 10/20/2020 Tomasa Epperson MD OR same dept: 10/20/2020 Tomasa Epperson MD OR same specialty: 10/20/2020 Tomasa Epperson MD Last physical: 1/26/2021 Last MTM visit: Visit date not found    Next visit within 3 mo: Visit date not found  Next physical within 3 mo: Visit date not found  Prescriber OR PCP: Tomasa Epperson MD  Last diagnosis associated with med order: 1. Vitamin B-complex deficiency  - cyanocobalamin 1,000 mcg/mL injection [Pharmacy Med Name: CYANOCOBALAMIN 1,000 MCG/ML]; INJECT 1 ML (1,000 MCG TOTAL) INTO THE SHOULDER, THIGH, OR BUTTOCKS EVERY 30 (THIRTY) DAYS.  Dispense: 3 mL; Refill: 3               Passed - CBC in last 12 months     WBC   Date Value Ref Range Status   01/26/2021 5.7 4.0 - 11.0 thou/uL Final     RBC   Date Value Ref Range Status   01/26/2021 5.16 4.40 - 6.20 mill/uL Final     Hemoglobin   Date Value Ref Range Status    01/26/2021 14.8 14.0 - 18.0 g/dL Final     Hematocrit   Date Value Ref Range Status   01/26/2021 45.0 40.0 - 54.0 % Final     MCV   Date Value Ref Range Status   01/26/2021 87 80 - 100 fL Final     MCH   Date Value Ref Range Status   01/26/2021 28.7 27.0 - 34.0 pg Final     MCHC   Date Value Ref Range Status   01/26/2021 32.9 32.0 - 36.0 g/dL Final     RDW   Date Value Ref Range Status   01/26/2021 13.0 11.0 - 14.5 % Final     Platelets   Date Value Ref Range Status   01/26/2021 240 140 - 440 thou/uL Final     MPV   Date Value Ref Range Status   01/26/2021 7.7 7.0 - 10.0 fL Final

## 2021-06-26 ENCOUNTER — HEALTH MAINTENANCE LETTER (OUTPATIENT)
Age: 61
End: 2021-06-26

## 2021-07-03 NOTE — ADDENDUM NOTE
Addendum Note by Lionel Dean at 10/29/2018 10:33 AM     Author: Lionel Dean Service: -- Author Type: --    Filed: 10/29/2018 10:33 AM Encounter Date: 10/29/2018 Status: Signed    : Lionel Dean    Addended by: LIONEL DEAN on: 10/29/2018 10:33 AM        Modules accepted: Orders

## 2021-07-03 NOTE — ADDENDUM NOTE
Addendum Note by Tomasa Epperson MD at 10/20/2020 10:20 AM     Author: Tomasa Epperson MD Service: -- Author Type: Physician    Filed: 10/30/2020  8:03 AM Encounter Date: 10/20/2020 Status: Signed    : Tomasa Epperson MD (Physician)    Addended by: TOMASA EPPERSON on: 10/30/2020 08:03 AM        Modules accepted: Orders

## 2021-10-06 ENCOUNTER — OFFICE VISIT (OUTPATIENT)
Dept: FAMILY MEDICINE | Facility: CLINIC | Age: 61
End: 2021-10-06
Payer: COMMERCIAL

## 2021-10-06 VITALS
HEIGHT: 68 IN | BODY MASS INDEX: 32.43 KG/M2 | WEIGHT: 214 LBS | TEMPERATURE: 98.3 F | OXYGEN SATURATION: 98 % | HEART RATE: 80 BPM | SYSTOLIC BLOOD PRESSURE: 122 MMHG | DIASTOLIC BLOOD PRESSURE: 86 MMHG

## 2021-10-06 DIAGNOSIS — Z11.59 NEED FOR HEPATITIS C SCREENING TEST: ICD-10-CM

## 2021-10-06 DIAGNOSIS — Z00.00 ROUTINE GENERAL MEDICAL EXAMINATION AT A HEALTH CARE FACILITY: Primary | ICD-10-CM

## 2021-10-06 DIAGNOSIS — E78.00 PURE HYPERCHOLESTEROLEMIA: ICD-10-CM

## 2021-10-06 DIAGNOSIS — I10 ESSENTIAL HYPERTENSION: ICD-10-CM

## 2021-10-06 DIAGNOSIS — Z23 NEED FOR SHINGLES VACCINE: ICD-10-CM

## 2021-10-06 DIAGNOSIS — Z13.220 LIPID SCREENING: ICD-10-CM

## 2021-10-06 DIAGNOSIS — G25.81 RESTLESS LEG SYNDROME: ICD-10-CM

## 2021-10-06 DIAGNOSIS — Z13.1 SCREENING FOR DIABETES MELLITUS: ICD-10-CM

## 2021-10-06 DIAGNOSIS — E53.9 VITAMIN B-COMPLEX DEFICIENCY: ICD-10-CM

## 2021-10-06 DIAGNOSIS — Z11.4 SCREENING FOR HIV (HUMAN IMMUNODEFICIENCY VIRUS): ICD-10-CM

## 2021-10-06 DIAGNOSIS — Z13.0 SCREENING, ANEMIA, DEFICIENCY, IRON: ICD-10-CM

## 2021-10-06 DIAGNOSIS — Z12.5 SCREENING PSA (PROSTATE SPECIFIC ANTIGEN): ICD-10-CM

## 2021-10-06 DIAGNOSIS — N52.9 ERECTILE DYSFUNCTION, UNSPECIFIED ERECTILE DYSFUNCTION TYPE: ICD-10-CM

## 2021-10-06 DIAGNOSIS — Z23 NEED FOR PROPHYLACTIC VACCINATION AND INOCULATION AGAINST INFLUENZA: ICD-10-CM

## 2021-10-06 LAB
ALBUMIN SERPL-MCNC: 4 G/DL (ref 3.5–5)
ALP SERPL-CCNC: 78 U/L (ref 45–120)
ALT SERPL W P-5'-P-CCNC: 29 U/L (ref 0–45)
ANION GAP SERPL CALCULATED.3IONS-SCNC: 12 MMOL/L (ref 5–18)
AST SERPL W P-5'-P-CCNC: 24 U/L (ref 0–40)
BILIRUB SERPL-MCNC: 0.5 MG/DL (ref 0–1)
BUN SERPL-MCNC: 10 MG/DL (ref 8–22)
CALCIUM SERPL-MCNC: 10.1 MG/DL (ref 8.5–10.5)
CHLORIDE BLD-SCNC: 105 MMOL/L (ref 98–107)
CHOLEST SERPL-MCNC: 175 MG/DL
CO2 SERPL-SCNC: 24 MMOL/L (ref 22–31)
CREAT SERPL-MCNC: 0.75 MG/DL (ref 0.7–1.3)
ERYTHROCYTE [DISTWIDTH] IN BLOOD BY AUTOMATED COUNT: 15.3 % (ref 10–15)
FASTING STATUS PATIENT QL REPORTED: YES
FERRITIN SERPL-MCNC: 10 NG/ML (ref 27–300)
GFR SERPL CREATININE-BSD FRML MDRD: >90 ML/MIN/1.73M2
GLUCOSE BLD-MCNC: 93 MG/DL (ref 70–125)
HCT VFR BLD AUTO: 39.8 % (ref 40–53)
HDLC SERPL-MCNC: 66 MG/DL
HGB BLD-MCNC: 12.5 G/DL (ref 13.3–17.7)
HIV 1+2 AB+HIV1 P24 AG SERPL QL IA: NEGATIVE
LDLC SERPL CALC-MCNC: 100 MG/DL
MCH RBC QN AUTO: 25.3 PG (ref 26.5–33)
MCHC RBC AUTO-ENTMCNC: 31.4 G/DL (ref 31.5–36.5)
MCV RBC AUTO: 80 FL (ref 78–100)
PLATELET # BLD AUTO: 228 10E3/UL (ref 150–450)
POTASSIUM BLD-SCNC: 4.7 MMOL/L (ref 3.5–5)
PROT SERPL-MCNC: 7.2 G/DL (ref 6–8)
PSA SERPL-MCNC: 0.56 UG/L (ref 0–4.5)
RBC # BLD AUTO: 4.95 10E6/UL (ref 4.4–5.9)
SODIUM SERPL-SCNC: 141 MMOL/L (ref 136–145)
TRIGL SERPL-MCNC: 46 MG/DL
WBC # BLD AUTO: 6.1 10E3/UL (ref 4–11)

## 2021-10-06 PROCEDURE — 80053 COMPREHEN METABOLIC PANEL: CPT | Performed by: FAMILY MEDICINE

## 2021-10-06 PROCEDURE — 85027 COMPLETE CBC AUTOMATED: CPT | Performed by: FAMILY MEDICINE

## 2021-10-06 PROCEDURE — 36415 COLL VENOUS BLD VENIPUNCTURE: CPT | Performed by: FAMILY MEDICINE

## 2021-10-06 PROCEDURE — 90472 IMMUNIZATION ADMIN EACH ADD: CPT | Performed by: FAMILY MEDICINE

## 2021-10-06 PROCEDURE — 86803 HEPATITIS C AB TEST: CPT | Performed by: FAMILY MEDICINE

## 2021-10-06 PROCEDURE — 99396 PREV VISIT EST AGE 40-64: CPT | Mod: 25 | Performed by: FAMILY MEDICINE

## 2021-10-06 PROCEDURE — 90471 IMMUNIZATION ADMIN: CPT | Performed by: FAMILY MEDICINE

## 2021-10-06 PROCEDURE — 82728 ASSAY OF FERRITIN: CPT | Performed by: FAMILY MEDICINE

## 2021-10-06 PROCEDURE — 80061 LIPID PANEL: CPT | Performed by: FAMILY MEDICINE

## 2021-10-06 PROCEDURE — 90750 HZV VACC RECOMBINANT IM: CPT | Performed by: FAMILY MEDICINE

## 2021-10-06 PROCEDURE — 90682 RIV4 VACC RECOMBINANT DNA IM: CPT | Performed by: FAMILY MEDICINE

## 2021-10-06 PROCEDURE — G0103 PSA SCREENING: HCPCS | Performed by: FAMILY MEDICINE

## 2021-10-06 PROCEDURE — 87389 HIV-1 AG W/HIV-1&-2 AB AG IA: CPT | Performed by: FAMILY MEDICINE

## 2021-10-06 RX ORDER — CYANOCOBALAMIN 1000 UG/ML
1000 INJECTION, SOLUTION INTRAMUSCULAR; SUBCUTANEOUS
Qty: 3 ML | Refills: 3 | Status: SHIPPED | OUTPATIENT
Start: 2021-10-06 | End: 2022-07-13

## 2021-10-06 RX ORDER — HYDROCHLOROTHIAZIDE 12.5 MG/1
CAPSULE ORAL
Qty: 90 CAPSULE | Refills: 3 | Status: SHIPPED | OUTPATIENT
Start: 2021-10-06 | End: 2022-09-20

## 2021-10-06 RX ORDER — SILDENAFIL 100 MG/1
100 TABLET, FILM COATED ORAL DAILY PRN
Qty: 16 TABLET | Refills: 3 | Status: SHIPPED | OUTPATIENT
Start: 2021-10-06

## 2021-10-06 RX ORDER — LISINOPRIL 10 MG/1
10 TABLET ORAL DAILY
Qty: 90 TABLET | Refills: 3 | Status: SHIPPED | OUTPATIENT
Start: 2021-10-06 | End: 2022-09-20

## 2021-10-06 RX ORDER — SIMVASTATIN 40 MG
TABLET ORAL
Qty: 90 TABLET | Refills: 3 | Status: SHIPPED | OUTPATIENT
Start: 2021-10-06 | End: 2022-09-20

## 2021-10-06 ASSESSMENT — MIFFLIN-ST. JEOR: SCORE: 1740.2

## 2021-10-06 NOTE — PROGRESS NOTES
SUBJECTIVE:   CC: Norris Jolly is an 61 year old male who presents for preventative health visit.     Patient has been advised of split billing requirements and indicates understanding: Yes  Healthy Habits:     Getting at least 3 servings of Calcium per day:  Yes    Bi-annual eye exam:  Yes    Dental care twice a year:  Yes    Sleep apnea or symptoms of sleep apnea:  Daytime drowsiness    Diet:  Regular (no restrictions)    Frequency of exercise:  2-3 days/week    Duration of exercise:  15-30 minutes    Taking medications regularly:  No    Medication side effects:  None    PHQ-2 Total Score: 0    History of colon cancer with right hemicolectomy performed approximately 20 years ago. Had screening colonoscopy performed in April with 1 sigmoid polyp noted to be tubular adenoma.  He was advised to repeat colonoscopy in 3 years. Had follow-up CT enterography performed afterwards showing some swelling at terminal ileum and previously known cystic lesion at the tail of the pancreas. There was no evidence of lymphadenopathy or metastasis.      History of hypertension/hyperlipidemia currently managed with use of hydrochlorothiazide 12-1/2 mg daily, lisinopril 10 mg daily, and simvastatin 40 mg daily.  No side effects are reported.No chest pain, palpitations, shortness of breath, or peripheral swelling.     Patient notes chronic right low back pain for the past few months.  Seeing chiropractor.  Will follow-up if symptoms are worsening.  Also notes chronic left medial knee pain.  Wonders if he might have a tear.  Doesn't feel that symptoms currently are restricting his activities.  Will follow-up if noting worsening symptoms.      Also feels symptoms of restless legs in the evenings while sitting in easy chair.  He has noted this for several months now.   Symptoms do not impair his sleep particularly. No obvious blood loss recently.     Today's PHQ-2 Score:   PHQ-2 ( 1999 Pfizer) 9/30/2021   Q1: Little interest or pleasure  in doing things 0   Q2: Feeling down, depressed or hopeless 0   PHQ-2 Score 0   Q1: Little interest or pleasure in doing things Not at all   Q2: Feeling down, depressed or hopeless Not at all   PHQ-2 Score 0       Abuse: Current or Past(Physical, Sexual or Emotional)- No  Do you feel safe in your environment? Yes    Have you ever done Advance Care Planning? (For example, a Health Directive, POLST, or a discussion with a medical provider or your loved ones about your wishes): No, advance care planning information given to patient to review.  Patient plans to discuss their wishes with loved ones or provider.      Social History     Tobacco Use     Smoking status: Former Smoker     Smokeless tobacco: Former User     Quit date: 3/25/1985   Substance Use Topics     Alcohol use: Yes     If you drink alcohol do you typically have >3 drinks per day or >7 drinks per week? No    Alcohol Use 9/30/2021   Prescreen: >3 drinks/day or >7 drinks/week? Yes   AUDIT SCORE  8       Last PSA:   Prostate Specific Antigen Screen   Date Value Ref Range Status   10/20/2020 1.0 0.0 - 4.5 ng/mL Final       Reviewed orders with patient. Reviewed health maintenance and updated orders accordingly - Yes  Lab work is in process    Reviewed and updated as needed this visit by clinical staff  Tobacco  Allergies  Meds  Problems  Med Hx  Surg Hx  Fam Hx          Reviewed and updated as needed this visit by Provider  Tobacco  Allergies  Meds  Problems  Med Hx  Surg Hx  Fam Hx         Past Medical History:   Diagnosis Date     Colon cancer (H)      Hypertension       Past Surgical History:   Procedure Laterality Date     COLON SURGERY       LAPAROSCOPIC CHOLECYSTECTOMY  02/08/2021       Review of Systems  CONSTITUTIONAL: NEGATIVE for fever, chills, change in weight  INTEGUMENTARY/SKIN: NEGATIVE for worrisome rashes, moles or lesions  EYES: NEGATIVE for vision changes or irritation  ENT: NEGATIVE for ear, mouth and throat problems  RESP:  "NEGATIVE for significant cough or SOB  CV: NEGATIVE for chest pain, palpitations or peripheral edema  GI: NEGATIVE for nausea, abdominal pain, heartburn, or change in bowel habits   male: negative for dysuria, hematuria, decreased urinary stream, erectile dysfunction, urethral discharge  MUSCULOSKELETAL: NEGATIVE for significant arthralgias or myalgia  NEURO: NEGATIVE for weakness, dizziness or paresthesias  PSYCHIATRIC: NEGATIVE for changes in mood or affect    OBJECTIVE:   /86   Pulse 80   Temp 98.3  F (36.8  C)   Ht 1.727 m (5' 8\")   Wt 97.1 kg (214 lb)   SpO2 98%   BMI 32.54 kg/m      Physical Exam  General Appearance:    Alert, cooperative, no distress.   Head:    Normocephalic, without obvious abnormality.   Eyes:    PERRL, conjunctivae clear, EOM's intact        Ears:    Normal TM's and external ear canals, bilaterally   Nose:   septum midline, mucosa normal, no drainage    or sinus tenderness   Throat:   Lips, mucosa, and tongue normal; teeth and gums normal. No pharyngeal erythema or exudate.   Neck:   Supple, symmetrical, trachea midline, no adenopathy.  No thyroid enlargement/tenderness/nodules      Lungs:     Clear to auscultation bilaterally, respirations unlabored   Heart:    Regular rate and rhythm, S1 and S2 normal.   Abdomen:     Soft, non-tender, non-distended, bowel sounds present,     no masses, no organomegaly   Extremities:   Extremities normal, atraumatic, no cyanosis or edema   Pulses:   2+DP/PT pulses bilaterally   Skin:   Skin color and texture normal, no rashes or lesions   Psych:   Affect pleasant, cooperative.    Neurologic:   Gait and speech are normal. Reflexes symmetric at the         patellae bilaterally.      Labs : pending     ASSESSMENT/PLAN:   Norris was seen today for physical.    Diagnoses and all orders for this visit:    Routine general medical examination at a health care facility    Lipid screening  -     Lipid panel reflex to direct LDL Fasting; Future  -     " Lipid panel reflex to direct LDL Fasting    Screening for diabetes mellitus  -     Comprehensive metabolic panel (BMP + Alb, Alk Phos, ALT, AST, Total. Bili, TP); Future  -     Comprehensive metabolic panel (BMP + Alb, Alk Phos, ALT, AST, Total. Bili, TP)    Essential hypertension  -     Comprehensive metabolic panel (BMP + Alb, Alk Phos, ALT, AST, Total. Bili, TP); Future  -     hydrochlorothiazide (MICROZIDE) 12.5 MG capsule; TAKE 1 CAPSULE BY MOUTH EVERY DAY IN THE MORNING  -     lisinopril (ZESTRIL) 10 MG tablet; Take 1 tablet (10 mg) by mouth daily  -     Comprehensive metabolic panel (BMP + Alb, Alk Phos, ALT, AST, Total. Bili, TP)    Essential Hypercholesterolemia  -     Comprehensive metabolic panel (BMP + Alb, Alk Phos, ALT, AST, Total. Bili, TP); Future  -     simvastatin (ZOCOR) 40 MG tablet; TAKE 1 TABLET BY MOUTH EVERYDAY AT BEDTIME  -     Comprehensive metabolic panel (BMP + Alb, Alk Phos, ALT, AST, Total. Bili, TP)    Screening PSA (prostate specific antigen)  -     PSA, screen; Future  -     PSA, screen    Screening, anemia, deficiency, iron  -     CBC with platelets; Future  -     CBC with platelets    Screening for HIV (human immunodeficiency virus)  -     HIV Antigen Antibody Combo; Future  -     HIV Antigen Antibody Combo    Need for hepatitis C screening test  -     Hepatitis C Screen Reflex to HCV RNA Quant and Genotype; Future  -     Hepatitis C Screen Reflex to HCV RNA Quant and Genotype    Restless leg syndrome  -     Ferritin; Future  -     Ferritin  -     ferrous gluconate (FERGON) 324 (38 Fe) MG tablet; Take 1 tablet (324 mg) by mouth daily (with breakfast) Take with 2oz of citrus juice or a vitamin C supplement to aide in absorption.    Need for shingles vaccine  -     ZOSTER VACCINE RECOMBINANT ADJUVANTED IM NJX    Need for prophylactic vaccination and inoculation against influenza  -     INFLUENZA QUAD, RECOMBINANT, P-FREE (RIV4) (FLUBLOK)    Vitamin B-complex deficiency  -      "cyanocobalamin (CYANOCOBALAMIN) 1000 MCG/ML injection; Inject 1 mL (1,000 mcg) into the muscle every 30 days    Erectile dysfunction, unspecified erectile dysfunction type  -     sildenafil (VIAGRA) 100 MG tablet; Take 1 tablet (100 mg) by mouth daily as needed (erectile dysfunction)    Other orders  -     REVIEW OF HEALTH MAINTENANCE PROTOCOL ORDERS        Patient has been advised of split billing requirements and indicates understanding: Yes  COUNSELING:   Reviewed preventive health counseling, as reflected in patient instructions       Regular exercise       Healthy diet/nutrition       Immunizations    Vaccinated for: Influenza and Zoster.  Reviewed that patient would be eligible to receive Pfizer COVID-19 booster shot anytime after 10/15/21 (qualifies due to BMI)         Consider Hep C screening for all patients one time for ages 18-79 years       HIV screeninx in teen years, 1x in adult years, and at intervals if high risk    Estimated body mass index is 32.54 kg/m  as calculated from the following:    Height as of this encounter: 1.727 m (5' 8\").    Weight as of this encounter: 97.1 kg (214 lb).     Weight management plan: Discussed healthy diet and exercise guidelines    He reports that he has quit smoking. He quit smokeless tobacco use about 36 years ago.    Return in about 1 year (around 10/6/2022) for Routine preventive.     Alexandre Stout MD  [facility]  "

## 2021-10-07 LAB — HCV AB SERPL QL IA: NONREACTIVE

## 2021-10-10 PROBLEM — K82.8 OTHER SPECIFIED DISEASES OF GALLBLADDER: Status: ACTIVE | Noted: 2021-10-10

## 2021-10-10 RX ORDER — FERROUS GLUCONATE 324(38)MG
324 TABLET ORAL
Qty: 100 TABLET | Refills: 3 | Status: SHIPPED | OUTPATIENT
Start: 2021-10-10 | End: 2022-09-20

## 2021-10-12 ENCOUNTER — MYC MEDICAL ADVICE (OUTPATIENT)
Dept: FAMILY MEDICINE | Facility: CLINIC | Age: 61
End: 2021-10-12

## 2021-10-12 DIAGNOSIS — D50.9 IRON DEFICIENCY ANEMIA, UNSPECIFIED IRON DEFICIENCY ANEMIA TYPE: Primary | ICD-10-CM

## 2021-11-02 ENCOUNTER — TRANSFERRED RECORDS (OUTPATIENT)
Dept: HEALTH INFORMATION MANAGEMENT | Facility: CLINIC | Age: 61
End: 2021-11-02
Payer: COMMERCIAL

## 2021-12-01 ENCOUNTER — ALLIED HEALTH/NURSE VISIT (OUTPATIENT)
Dept: NURSING | Facility: CLINIC | Age: 61
End: 2021-12-01
Payer: COMMERCIAL

## 2021-12-01 DIAGNOSIS — Z23 NEED FOR COVID-19 VACCINE: Primary | ICD-10-CM

## 2021-12-01 PROCEDURE — 0004A PR COVID VAC PFIZER DIL RECON 30 MCG/0.3 ML IM: CPT

## 2021-12-01 PROCEDURE — 99207 PR NO CHARGE NURSE ONLY: CPT

## 2021-12-01 PROCEDURE — 91300 PR COVID VAC PFIZER DIL RECON 30 MCG/0.3 ML IM: CPT

## 2021-12-23 ENCOUNTER — OFFICE VISIT (OUTPATIENT)
Dept: FAMILY MEDICINE | Facility: CLINIC | Age: 61
End: 2021-12-23
Payer: COMMERCIAL

## 2021-12-23 VITALS
SYSTOLIC BLOOD PRESSURE: 144 MMHG | BODY MASS INDEX: 32.65 KG/M2 | OXYGEN SATURATION: 98 % | DIASTOLIC BLOOD PRESSURE: 86 MMHG | WEIGHT: 214.7 LBS | HEART RATE: 75 BPM

## 2021-12-23 DIAGNOSIS — K91.5 POST-CHOLECYSTECTOMY SYNDROME: ICD-10-CM

## 2021-12-23 DIAGNOSIS — E78.00 PURE HYPERCHOLESTEROLEMIA: ICD-10-CM

## 2021-12-23 DIAGNOSIS — K22.70 BARRETT'S ESOPHAGUS WITH ESOPHAGITIS: ICD-10-CM

## 2021-12-23 DIAGNOSIS — K20.90 BARRETT'S ESOPHAGUS WITH ESOPHAGITIS: ICD-10-CM

## 2021-12-23 DIAGNOSIS — G25.81 RESTLESS LEG SYNDROME: ICD-10-CM

## 2021-12-23 DIAGNOSIS — F10.10 ALCOHOL USE DISORDER, MILD, ABUSE: ICD-10-CM

## 2021-12-23 DIAGNOSIS — N52.9 ERECTILE DYSFUNCTION, UNSPECIFIED ERECTILE DYSFUNCTION TYPE: ICD-10-CM

## 2021-12-23 DIAGNOSIS — D50.9 IRON DEFICIENCY ANEMIA, UNSPECIFIED IRON DEFICIENCY ANEMIA TYPE: Primary | ICD-10-CM

## 2021-12-23 DIAGNOSIS — I10 ESSENTIAL HYPERTENSION: ICD-10-CM

## 2021-12-23 DIAGNOSIS — E53.8 VITAMIN B12 DEFICIENCY: ICD-10-CM

## 2021-12-23 DIAGNOSIS — Z85.038 HISTORY OF COLON CANCER: ICD-10-CM

## 2021-12-23 LAB
ERYTHROCYTE [DISTWIDTH] IN BLOOD BY AUTOMATED COUNT: 16.1 % (ref 10–15)
FERRITIN SERPL-MCNC: 19 NG/ML (ref 27–300)
HCT VFR BLD AUTO: 44.4 % (ref 40–53)
HGB BLD-MCNC: 14.7 G/DL (ref 13.3–17.7)
MCH RBC QN AUTO: 27.9 PG (ref 26.5–33)
MCHC RBC AUTO-ENTMCNC: 33.1 G/DL (ref 31.5–36.5)
MCV RBC AUTO: 84 FL (ref 78–100)
PLATELET # BLD AUTO: 221 10E3/UL (ref 150–450)
RBC # BLD AUTO: 5.27 10E6/UL (ref 4.4–5.9)
WBC # BLD AUTO: 5.5 10E3/UL (ref 4–11)

## 2021-12-23 PROCEDURE — 99214 OFFICE O/P EST MOD 30 MIN: CPT | Performed by: FAMILY MEDICINE

## 2021-12-23 PROCEDURE — 82728 ASSAY OF FERRITIN: CPT | Performed by: FAMILY MEDICINE

## 2021-12-23 PROCEDURE — 85027 COMPLETE CBC AUTOMATED: CPT | Performed by: FAMILY MEDICINE

## 2021-12-23 PROCEDURE — 36415 COLL VENOUS BLD VENIPUNCTURE: CPT | Performed by: FAMILY MEDICINE

## 2021-12-23 RX ORDER — OMEPRAZOLE 40 MG/1
40 CAPSULE, DELAYED RELEASE ORAL DAILY
COMMUNITY
End: 2022-09-20

## 2021-12-26 PROBLEM — D50.9 IRON DEFICIENCY ANEMIA, UNSPECIFIED IRON DEFICIENCY ANEMIA TYPE: Status: ACTIVE | Noted: 2021-12-26

## 2021-12-26 PROBLEM — K91.5 POST-CHOLECYSTECTOMY SYNDROME: Status: ACTIVE | Noted: 2021-12-26

## 2021-12-26 PROBLEM — Z85.038 HISTORY OF COLON CANCER: Status: ACTIVE | Noted: 2021-12-26

## 2021-12-26 NOTE — RESULT ENCOUNTER NOTE
Norris,  It was nice to see you in the clinic this past week.  Your test results are back and I would like to review those.    The 2 tests that I did included a ferritin which measures the stores of iron in your body.  It has improved and now is 19 and was 10.  We like to see that at 27 or above.  Your hemoglobin has improved nicely and now is 14.7 up from 12.5.  So you do not have anemia any longer.  I do want you to continue your iron supplement on a daily basis and make sure that you take some vitamin C with that either drinking 2 to 3 ounces of orange juice or taking a vitamin C tablet with your iron.    I would like to see you back in the clinic in about 3 months to recheck your blood pressure and your ferritin.  Also would like to see how you are doing with the diarrhea that has been occurring after you eat.  Hopefully taking some natural fiber such as Benefiber or Citrucel will help with that.    I believe that [facility] wanted to follow-up on the upper endoscopy in February 2022 and I would recommend that you do that follow-up to make sure that the esophagitis has improved.    Please let me know if you have other questions.  Best regards,Dr. Barreto

## 2021-12-26 NOTE — PROGRESS NOTES
ASSESSMENT/PLAN:       1. Iron deficiency anemia, of uncertain etiology    - CBC with platelets, hemoglobin has improved and no longer anemic with a hemoglobin of 14.7 up from 12.5    - Ferritin ferritin is still low at 19 but improved from 10  Patient should continue on his iron supplement daily and make sure he is taking it with some vitamin C    2. Essential Hypercholesterolemia  The patient will continue with simvastatin 40 mg daily    3. Essential hypertension  Low-salt diet  Patient will continue with hydrochlorothiazide 12.5 mg every morning and lisinopril 10 mg daily  I had either like the patient to monitor his blood pressure at home and after a couple weeks send me his blood pressure readings or come in for a [facility] calendar appointment to recheck his blood pressure    4. Restless leg syndrome  This is improved with improving his iron status    5.  Vargas's esophagus with esophagitis but no dysplasia  Patient is to follow-up with [facility] for endoscopy in February 2022  The patient will continue with omeprazole 40 mg every morning until the results are back from the follow-up upper endoscopy.    6. Erectile dysfunction, unspecified erectile dysfunction type  Continue with sildenafil    7. History of colon cancer  The patient needs another colonoscopy in April 2024    8. Vitamin B12 deficiency  Continue with monthly vitamin B12 injections at home    9. Alcohol use disorder, mild, abuse  Try to decrease alcohol use    10. Post-cholecystectomy syndrome  I suggested that he try to use some natural fiber such as Benefiber or Citrucel on a daily basis to see if that might help lessen his frequent stools.  Also try to limit the fatty fried and greasy foods    Test results by [facility]  Follow-up 3 months to recheck blood pressure and also recheck ferritin            No immunizations due    Wicho Barreto MD      PROGRESS NOTE   12/26/2021    SUBJECTIVE:  [id number]  who presents for   Chief Complaint   Patient  presents with     Establish Care     Previously Dr. Epperson.      The patient is seen today to establish care.  Had been cared for by Dr. Epperson.    In October 2021 the patient was found to be iron deficient with a hemoglobin of 12.5 and a ferritin level of 10.  Because of that the patient had a upper endoscopy which showed esophagitis with changes of Vargas's esophagus without dysplasia on 11/2/2021.  The patient is on omeprazole 40 mg every morning.  The plan was for a follow-up upper endoscopy in February 2022.  The patient is taking ferrous gluconate 324 mg daily    The patient has hyperlipidemia treated with simvastatin 40 mg daily and his last lipids were 175/46/66/100.    History of hypertension and his blood pressure is not controlled today but checking it more than once.  His medication includes lisinopril 10 mg daily and hydrochlorothiazide 12.5 mg daily  No side effects from his blood pressure medication    Since treating his iron deficiency and anemia his restless leg symptoms have improved    Erectile dysfunction treated with sildenafil 100 mg with success and no side effects    History of colon cancer 2000 with last colonoscopy 4/20/2021 with a single sigmoid polyp that was adenomatous, good prep and 3-year follow-up    Noted to have a vitamin B12 deficiency and is on self injections at 1000 mcg every 30 days at home.    Audit score 8 and has tried to cut down on his alcohol use    The patient had a cholecystectomy in January 2021 because of sludge in the gallbladder and since that time he has had postprandial diarrhea.  Really has not improved since the cholecystectomy.  He is not try to do anything to remedy that issue.  He does have some increased gas and bloating.      Patient Active Problem List   Diagnosis     Alcohol Abuse     Vitamin B12 Deficiency     Adenocarcinoma Of The Ascending Colon     Essential Hypercholesterolemia     Essential Hypertension     Male Erectile Disorder Due To Physical  "Condition     Small bowel obstruction (H)     Colon cancer (H)     Generalized abdominal pain     Hypokalemia     Other specified diseases of gallbladder     Vargas's esophagus with esophagitis     Post-cholecystectomy syndrome     History of colon cancer     Iron deficiency anemia, of uncertain etiology       Current Outpatient Medications   Medication Sig Dispense Refill     aspirin 81 mg chewable tablet [ASPIRIN 81 MG CHEWABLE TABLET] Chew 81 mg daily.       cyanocobalamin (CYANOCOBALAMIN) 1000 MCG/ML injection Inject 1 mL (1,000 mcg) into the muscle every 30 days 3 mL 3     ferrous gluconate (FERGON) 324 (38 Fe) MG tablet Take 1 tablet (324 mg) by mouth daily (with breakfast) Take with 2oz of citrus juice or a vitamin C supplement to aide in absorption. 100 tablet 3     hydrochlorothiazide (MICROZIDE) 12.5 MG capsule TAKE 1 CAPSULE BY MOUTH EVERY DAY IN THE MORNING 90 capsule 3     lisinopril (ZESTRIL) 10 MG tablet Take 1 tablet (10 mg) by mouth daily 90 tablet 3     multivitamin therapeutic (THERAGRAN) tablet [MULTIVITAMIN THERAPEUTIC (THERAGRAN) TABLET] Take 1 tablet by mouth daily.  0     omeprazole (PRILOSEC) 40 MG DR capsule Take 40 mg by mouth daily       sildenafil (VIAGRA) 100 MG tablet Take 1 tablet (100 mg) by mouth daily as needed (erectile dysfunction) 16 tablet 3     simvastatin (ZOCOR) 40 MG tablet TAKE 1 TABLET BY MOUTH EVERYDAY AT BEDTIME 90 tablet 3     SYRINGE, DISPOSABLE, MISC [SYRINGE, DISPOSABLE, MISC] Syringe 21G X 1-1/2\" 3 ML Miscellaneous     Use with B12 shots qmonth         History   Smoking Status     Former Smoker   Smokeless Tobacco     Former User     Quit date: 3/25/1985           OBJECTIVE:      Recent Results (from the past 720 hour(s))   CBC with platelets    Collection Time: 12/23/21  9:35 AM   Result Value Ref Range    WBC Count 5.5 4.0 - 11.0 10e3/uL    RBC Count 5.27 4.40 - 5.90 10e6/uL    Hemoglobin 14.7 13.3 - 17.7 g/dL    Hematocrit 44.4 40.0 - 53.0 %    MCV 84 78 - 100 " fL    MCH 27.9 26.5 - 33.0 pg    MCHC 33.1 31.5 - 36.5 g/dL    RDW 16.1 (H) 10.0 - 15.0 %    Platelet Count 221 150 - 450 10e3/uL   Ferritin    Collection Time: 12/23/21  9:35 AM   Result Value Ref Range    Ferritin 19 (L) 27 - 300 ng/mL       Vitals:    12/23/21 0830 12/23/21 0915   BP: (!) 142/80 (!) 144/86   BP Location: Right arm Right arm   Patient Position: Sitting Sitting   Cuff Size: Adult Regular Adult Regular   Pulse: 75    SpO2: 98%    Weight: 97.4 kg (214 lb 11.2 oz)        Wt Readings from Last 3 Encounters:   12/23/21 97.4 kg (214 lb 11.2 oz)   10/06/21 97.1 kg (214 lb)   01/26/21 96.2 kg (212 lb)         Physical Exam:  GENERAL APPEARANCE: Very pleasant 61-year-old male, NAD, well hydrated, well nourished  SKIN:  Normal skin turgor, no lesions/rashes   HEENT: moist mucous membranes, no rhinorrhea  NECK: Normal without adenopathy or masses  CV: RRR, no M/G/R   LUNGS: CTAB  ABDOMEN: S&NT, no masses or enlarged organs   EXTREMITY: no edema and full ROM of all joints  NEURO: no focal findings

## 2022-01-05 ENCOUNTER — TRANSFERRED RECORDS (OUTPATIENT)
Dept: HEALTH INFORMATION MANAGEMENT | Facility: CLINIC | Age: 62
End: 2022-01-05
Payer: COMMERCIAL

## 2022-01-29 PROBLEM — K20.90 BARRETT'S ESOPHAGUS WITH ESOPHAGITIS: Status: ACTIVE | Noted: 2021-12-26

## 2022-01-29 PROBLEM — K22.70 BARRETT'S ESOPHAGUS WITH ESOPHAGITIS: Status: ACTIVE | Noted: 2021-12-26

## 2022-03-25 ENCOUNTER — OFFICE VISIT (OUTPATIENT)
Dept: FAMILY MEDICINE | Facility: CLINIC | Age: 62
End: 2022-03-25
Payer: COMMERCIAL

## 2022-03-25 VITALS
SYSTOLIC BLOOD PRESSURE: 130 MMHG | OXYGEN SATURATION: 97 % | WEIGHT: 219 LBS | HEART RATE: 80 BPM | DIASTOLIC BLOOD PRESSURE: 80 MMHG | HEIGHT: 68 IN | BODY MASS INDEX: 33.19 KG/M2

## 2022-03-25 DIAGNOSIS — D50.9 IRON DEFICIENCY ANEMIA, UNSPECIFIED IRON DEFICIENCY ANEMIA TYPE: Primary | ICD-10-CM

## 2022-03-25 DIAGNOSIS — K20.90 BARRETT'S ESOPHAGUS WITH ESOPHAGITIS: ICD-10-CM

## 2022-03-25 DIAGNOSIS — F10.10 ALCOHOL USE DISORDER, MILD, ABUSE: ICD-10-CM

## 2022-03-25 DIAGNOSIS — K22.70 BARRETT'S ESOPHAGUS WITH ESOPHAGITIS: ICD-10-CM

## 2022-03-25 DIAGNOSIS — I10 ESSENTIAL HYPERTENSION: ICD-10-CM

## 2022-03-25 DIAGNOSIS — Z23 NEED FOR SHINGLES VACCINE: ICD-10-CM

## 2022-03-25 LAB
ANION GAP SERPL CALCULATED.3IONS-SCNC: 12 MMOL/L (ref 5–18)
BUN SERPL-MCNC: 8 MG/DL (ref 8–22)
CALCIUM SERPL-MCNC: 10 MG/DL (ref 8.5–10.5)
CHLORIDE BLD-SCNC: 102 MMOL/L (ref 98–107)
CO2 SERPL-SCNC: 26 MMOL/L (ref 22–31)
CREAT SERPL-MCNC: 0.78 MG/DL (ref 0.7–1.3)
FERRITIN SERPL-MCNC: 134 NG/ML (ref 27–300)
GFR SERPL CREATININE-BSD FRML MDRD: >90 ML/MIN/1.73M2
GLUCOSE BLD-MCNC: 110 MG/DL (ref 70–125)
HGB BLD-MCNC: 15.2 G/DL (ref 13.3–17.7)
POTASSIUM BLD-SCNC: 4.7 MMOL/L (ref 3.5–5)
SODIUM SERPL-SCNC: 140 MMOL/L (ref 136–145)

## 2022-03-25 PROCEDURE — 90750 HZV VACC RECOMBINANT IM: CPT | Performed by: FAMILY MEDICINE

## 2022-03-25 PROCEDURE — 90471 IMMUNIZATION ADMIN: CPT | Performed by: FAMILY MEDICINE

## 2022-03-25 PROCEDURE — 99214 OFFICE O/P EST MOD 30 MIN: CPT | Mod: 25 | Performed by: FAMILY MEDICINE

## 2022-03-25 PROCEDURE — 85018 HEMOGLOBIN: CPT | Performed by: FAMILY MEDICINE

## 2022-03-25 PROCEDURE — 80048 BASIC METABOLIC PNL TOTAL CA: CPT | Performed by: FAMILY MEDICINE

## 2022-03-25 PROCEDURE — 36415 COLL VENOUS BLD VENIPUNCTURE: CPT | Performed by: FAMILY MEDICINE

## 2022-03-25 PROCEDURE — 82728 ASSAY OF FERRITIN: CPT | Performed by: FAMILY MEDICINE

## 2022-03-26 NOTE — RESULT ENCOUNTER NOTE
Norris,    It was nice to see you in the clinic this week.  Your test results are back and I would like to review those for you.    The metabolic panel shows that your electrolytes are normal including calcium.  The blood glucose is a screening test for diabetes and you do not have diabetes.  The BUN, creatinine and GFR look at your kidney function which is normal.    The ferritin is a measure of the stores of iron in your body and now is very good at a value of 134 which is up from a value of 10 five months ago.  Also I note that your hemoglobin is nicely in the normal range at 15.2 so no signs of anemia.  I would suggest that it is fine for you to stop taking your iron supplement.  I would continue to take your multivitamin which has some iron in it.    It would be good to see you back in the clinic in about 6 months.    Please let me know if you have other questions or if I can help in any other way.    Dr. Barreto

## 2022-03-26 NOTE — PROGRESS NOTES
ASSESSMENT/PLAN:       1. Iron deficiency anemia, of uncertain etiology    - Hemoglobin, hemoglobin is 15.2 which is normal now  - Ferritin, 134 which is normal up from a value of 19    I think it is okay for him to stop his iron supplement  Okay for him to continue his multivitamin    2. Essential hypertension  Blood pressure is controlled and will continue on the hydrochlorothiazide and lisinopril    - Basic metabolic panel    3. Vargas's esophagus with esophagitis  The patient will continue on omeprazole 40 mg first thing in the morning  Vargas's esophagus showed no dysplasia and next EGD January 2025    4. Alcohol use disorder, mild, abuse  Patient is consciously trying to cut back on his alcohol use aware that that can have negative effects for his esophagus and upper GI tract.    5. Need for shingles vaccine    - ZOSTER VACCINE RECOMBINANT ADJUVANTED (SHINGRIX)    Patient should follow-up in 6 months for a chronic disease visit  Test results per [facility]    No immunizations due    Wicho Barreto MD      PROGRESS NOTE   3/26/2022    SUBJECTIVE:  [id number]  who presents for   Chief Complaint   Patient presents with     RECHECK     follow up med check      The patient is seen today for a follow-up on his iron deficiency  Patient was last seen on December 2021 and at that time the ferritin level had improved and was at 19.  Hemoglobin was 14.7.  The etiology of the iron deficiency was not clear.  Patient has had a history of Vargas's esophagus without dysplasia and no evidence for bleeding from the esophagus.  Due for another EGD January 2025.  Last colonoscopy was negative and due for another colonoscopy April 2024.  History of colon cancer in 2001.  Last colonoscopy April 2021.    Hypertension controlled and treated with hydrochlorothiazide 12.5 mg every morning along with lisinopril 10 mg daily    The patient has cut back some on his alcohol use    Requesting his second shingles vaccine    Quality request for  "\"lung cancer screening however the patient only has a 5-pack-year history of smoking and quit 30 years ago    Was having some issues with diarrhea a few months ago and I encouraged him to try Citrucel which he has and that has improved his diarrhea significantly.    Low vitamin B12 treated with subcutaneous injections monthly at home    Patient Active Problem List   Diagnosis     Alcohol Abuse     Vitamin B12 Deficiency     Essential Hypercholesterolemia     Essential Hypertension     Male Erectile Disorder Due To Physical Condition     Small bowel obstruction (H)     Generalized abdominal pain     Hypokalemia     Other specified diseases of gallbladder     Vargas's esophagus with esophagitis     Post-cholecystectomy syndrome     History of colon cancer     Iron deficiency anemia, of uncertain etiology       Current Outpatient Medications   Medication Sig Dispense Refill     aspirin 81 mg chewable tablet [ASPIRIN 81 MG CHEWABLE TABLET] Chew 81 mg daily.       cyanocobalamin (CYANOCOBALAMIN) 1000 MCG/ML injection Inject 1 mL (1,000 mcg) into the muscle every 30 days 3 mL 3     ferrous gluconate (FERGON) 324 (38 Fe) MG tablet Take 1 tablet (324 mg) by mouth daily (with breakfast) Take with 2oz of citrus juice or a vitamin C supplement to aide in absorption. 100 tablet 3     hydrochlorothiazide (MICROZIDE) 12.5 MG capsule TAKE 1 CAPSULE BY MOUTH EVERY DAY IN THE MORNING 90 capsule 3     lisinopril (ZESTRIL) 10 MG tablet Take 1 tablet (10 mg) by mouth daily 90 tablet 3     multivitamin therapeutic (THERAGRAN) tablet [MULTIVITAMIN THERAPEUTIC (THERAGRAN) TABLET] Take 1 tablet by mouth daily.  0     omeprazole (PRILOSEC) 40 MG DR capsule Take 40 mg by mouth daily       sildenafil (VIAGRA) 100 MG tablet Take 1 tablet (100 mg) by mouth daily as needed (erectile dysfunction) 16 tablet 3     simvastatin (ZOCOR) 40 MG tablet TAKE 1 TABLET BY MOUTH EVERYDAY AT BEDTIME 90 tablet 3     SYRINGE, DISPOSABLE, MISC [SYRINGE, " "DISPOSABLE, MISC] Syringe 21G X 1-1/2\" 3 ML Miscellaneous     Use with B12 shots qmonth         History   Smoking Status     Former Smoker   Smokeless Tobacco     Former User     Quit date: 3/25/1985       ROS:  Answers for HPI/ROS submitted by the patient on 3/19/2022  How many servings of fruits and vegetables do you eat daily?: 0-1  On average, how many sweetened beverages do you drink each day (Examples: soda, juice, sweet tea, etc.  Do NOT count diet or artificially sweetened beverages)?: 0  How many minutes a day do you exercise enough to make your heart beat faster?: 10 to 19  How many days a week do you exercise enough to make your heart beat faster?: 3 or less  How many days per week do you miss taking your medication?: 0  What is the reason for your visit today?: follow up      OBJECTIVE:      Recent Results (from the past 120 hour(s))   Hemoglobin    Collection Time: 03/25/22  8:52 AM   Result Value Ref Range    Hemoglobin 15.2 13.3 - 17.7 g/dL   Ferritin    Collection Time: 03/25/22  8:52 AM   Result Value Ref Range    Ferritin 134 27 - 300 ng/mL   Basic metabolic panel    Collection Time: 03/25/22  8:52 AM   Result Value Ref Range    Sodium 140 136 - 145 mmol/L    Potassium 4.7 3.5 - 5.0 mmol/L    Chloride 102 98 - 107 mmol/L    Carbon Dioxide (CO2) 26 22 - 31 mmol/L    Anion Gap 12 5 - 18 mmol/L    Urea Nitrogen 8 8 - 22 mg/dL    Creatinine 0.78 0.70 - 1.30 mg/dL    Calcium 10.0 8.5 - 10.5 mg/dL    Glucose 110 70 - 125 mg/dL    GFR Estimate >90 >60 mL/min/1.73m2       Vitals:    03/25/22 0813   BP: 130/80   Pulse: 80   SpO2: 97%   Weight: 99.3 kg (219 lb)   Height: 1.727 m (5' 8\")     Wt Readings from Last 3 Encounters:   03/25/22 99.3 kg (219 lb)   12/23/21 97.4 kg (214 lb 11.2 oz)   10/06/21 97.1 kg (214 lb)           Physical Exam:  GENERAL APPEARANCE: 62-year-old male very pleasant, NAD, well hydrated, well nourished  SKIN:  Normal skin turgor, no lesions/rashes   HEENT: moist mucous membranes, no " rhinorrhea  NECK: Normal without adenopathy or masses, no carotid bruits  CV: RRR, no M/G/R   LUNGS: CTAB  ABDOMEN: S&NT, no masses or enlarged organs, no abdominal bruits  EXTREMITY: no edema and full ROM of all joints  NEURO: no focal findings

## 2022-04-18 ENCOUNTER — TRANSFERRED RECORDS (OUTPATIENT)
Dept: HEALTH INFORMATION MANAGEMENT | Facility: CLINIC | Age: 62
End: 2022-04-18
Payer: COMMERCIAL

## 2022-05-26 DIAGNOSIS — E53.9 VITAMIN B-COMPLEX DEFICIENCY: ICD-10-CM

## 2022-05-27 RX ORDER — CYANOCOBALAMIN 1000 UG/ML
1000 INJECTION, SOLUTION INTRAMUSCULAR; SUBCUTANEOUS
Qty: 3 ML | Refills: 3 | OUTPATIENT
Start: 2022-05-27

## 2022-07-12 ENCOUNTER — MYC MEDICAL ADVICE (OUTPATIENT)
Dept: FAMILY MEDICINE | Facility: CLINIC | Age: 62
End: 2022-07-12

## 2022-07-12 DIAGNOSIS — E53.9 VITAMIN B-COMPLEX DEFICIENCY: ICD-10-CM

## 2022-07-12 NOTE — TELEPHONE ENCOUNTER
Chief Complaint   Patient presents with     Refill Request     B12     cyanocobalamin (CYANOCOBALAMIN) 1000 MCG/ML injection  1,000 mcg, EVERY 30 DAYS     cyanocobalamin (CYANOCOBALAMIN) 1000 MCG/ML injection  1,000 mcg, EVERY 30 DAYS     Routing to erx pool.  Julianne Glass RN on 7/12/2022 at 3:07 PM

## 2022-07-13 RX ORDER — CYANOCOBALAMIN 1000 UG/ML
1000 INJECTION, SOLUTION INTRAMUSCULAR; SUBCUTANEOUS
Qty: 3 ML | Refills: 3 | Status: SHIPPED | OUTPATIENT
Start: 2022-07-13 | End: 2022-09-20

## 2022-07-13 NOTE — TELEPHONE ENCOUNTER
"Routing refill request to provider for review/approval because:    Former patient of Wicho Barreto & has not established care with another provider.  Please assign refill request to covering provider per Clinic standard process.    Last Written Prescription Date:  10/6/2021  Last Fill Quantity: 3mL,  # refills: 3   Last office visit provider:  12/23/2021     Requested Prescriptions   Pending Prescriptions Disp Refills     cyanocobalamin (CYANOCOBALAMIN) 1000 MCG/ML injection 3 mL 3     Sig: Inject 1 mL (1,000 mcg) into the muscle every 30 days       Vitamin Supplements (Adult) Protocol Passed - 7/13/2022  1:17 PM        Passed - High dose Vitamin D not ordered        Passed - Recent (12 mo) or future (30 days) visit within the authorizing provider's specialty     Patient has had an office visit with the authorizing provider or a provider within the authorizing providers department within the previous 12 mos or has a future within next 30 days. See \"Patient Info\" tab in inbasket, or \"Choose Columns\" in Meds & Orders section of the refill encounter.              Passed - Medication is active on med list             Gabby Fowler RN 07/13/22 1:20 PM  "

## 2022-08-05 ENCOUNTER — HOSPITAL ENCOUNTER (OUTPATIENT)
Dept: MRI IMAGING | Facility: CLINIC | Age: 62
Discharge: HOME | End: 2022-08-05
Attending: INTERNAL MEDICINE | Admitting: INTERNAL MEDICINE
Payer: COMMERCIAL

## 2022-08-05 DIAGNOSIS — K86.2 PANCREATIC CYST: ICD-10-CM

## 2022-08-05 PROCEDURE — 255N000002 HC RX 255 OP 636: Performed by: INTERNAL MEDICINE

## 2022-08-05 PROCEDURE — 74183 MRI ABD W/O CNTR FLWD CNTR: CPT

## 2022-08-05 PROCEDURE — A9585 GADOBUTROL INJECTION: HCPCS | Performed by: INTERNAL MEDICINE

## 2022-08-05 RX ORDER — GADOBUTROL 604.72 MG/ML
10 INJECTION INTRAVENOUS ONCE
Status: COMPLETED | OUTPATIENT
Start: 2022-08-05 | End: 2022-08-05

## 2022-08-05 RX ADMIN — GADOBUTROL 10 ML: 604.72 INJECTION INTRAVENOUS at 06:17

## 2022-08-11 ENCOUNTER — TRANSFERRED RECORDS (OUTPATIENT)
Dept: HEALTH INFORMATION MANAGEMENT | Facility: CLINIC | Age: 62
End: 2022-08-11

## 2022-09-20 ENCOUNTER — OFFICE VISIT (OUTPATIENT)
Dept: FAMILY MEDICINE | Facility: CLINIC | Age: 62
End: 2022-09-20
Payer: COMMERCIAL

## 2022-09-20 VITALS
HEART RATE: 85 BPM | BODY MASS INDEX: 33.95 KG/M2 | OXYGEN SATURATION: 98 % | DIASTOLIC BLOOD PRESSURE: 72 MMHG | WEIGHT: 223.3 LBS | SYSTOLIC BLOOD PRESSURE: 142 MMHG

## 2022-09-20 DIAGNOSIS — E78.00 PURE HYPERCHOLESTEROLEMIA: ICD-10-CM

## 2022-09-20 DIAGNOSIS — F10.10 ALCOHOL ABUSE: ICD-10-CM

## 2022-09-20 DIAGNOSIS — D50.9 IRON DEFICIENCY ANEMIA, UNSPECIFIED IRON DEFICIENCY ANEMIA TYPE: ICD-10-CM

## 2022-09-20 DIAGNOSIS — E53.9 VITAMIN B-COMPLEX DEFICIENCY: ICD-10-CM

## 2022-09-20 DIAGNOSIS — Z85.038 HISTORY OF COLON CANCER: ICD-10-CM

## 2022-09-20 DIAGNOSIS — E53.8 VITAMIN B12 DEFICIENCY: ICD-10-CM

## 2022-09-20 DIAGNOSIS — Z23 ENCOUNTER FOR IMMUNIZATION: ICD-10-CM

## 2022-09-20 DIAGNOSIS — N52.9 IMPOTENCE OF ORGANIC ORIGIN: ICD-10-CM

## 2022-09-20 DIAGNOSIS — K22.70 BARRETT'S ESOPHAGUS WITH ESOPHAGITIS: ICD-10-CM

## 2022-09-20 DIAGNOSIS — K20.90 BARRETT'S ESOPHAGUS WITH ESOPHAGITIS: ICD-10-CM

## 2022-09-20 DIAGNOSIS — Z12.5 SCREENING FOR PROSTATE CANCER: ICD-10-CM

## 2022-09-20 DIAGNOSIS — I10 ESSENTIAL HYPERTENSION: Primary | ICD-10-CM

## 2022-09-20 DIAGNOSIS — G25.81 RESTLESS LEG SYNDROME: ICD-10-CM

## 2022-09-20 DIAGNOSIS — K86.89 PANCREATIC MASS: ICD-10-CM

## 2022-09-20 PROBLEM — K63.5 POLYP OF COLON: Status: ACTIVE | Noted: 2021-04-22

## 2022-09-20 PROBLEM — K44.9 DIAPHRAGMATIC HERNIA: Status: ACTIVE | Noted: 2021-11-02

## 2022-09-20 PROBLEM — K86.2 CYST OF PANCREAS: Status: ACTIVE | Noted: 2022-09-20

## 2022-09-20 LAB
CHOLEST SERPL-MCNC: 160 MG/DL
ERYTHROCYTE [DISTWIDTH] IN BLOOD BY AUTOMATED COUNT: 15.1 % (ref 10–15)
FERRITIN SERPL-MCNC: 15 NG/ML (ref 31–409)
HCT VFR BLD AUTO: 38.2 % (ref 40–53)
HDLC SERPL-MCNC: 59 MG/DL
HGB BLD-MCNC: 11.8 G/DL (ref 13.3–17.7)
LDLC SERPL CALC-MCNC: 89 MG/DL
MCH RBC QN AUTO: 24.5 PG (ref 26.5–33)
MCHC RBC AUTO-ENTMCNC: 30.9 G/DL (ref 31.5–36.5)
MCV RBC AUTO: 79 FL (ref 78–100)
NONHDLC SERPL-MCNC: 101 MG/DL
PLATELET # BLD AUTO: 210 10E3/UL (ref 150–450)
PSA SERPL-MCNC: 0.54 NG/ML (ref 0–4.5)
RBC # BLD AUTO: 4.81 10E6/UL (ref 4.4–5.9)
TRIGL SERPL-MCNC: 60 MG/DL
VIT B12 SERPL-MCNC: 374 PG/ML (ref 232–1245)
WBC # BLD AUTO: 6.1 10E3/UL (ref 4–11)

## 2022-09-20 PROCEDURE — 90682 RIV4 VACC RECOMBINANT DNA IM: CPT | Performed by: PHYSICIAN ASSISTANT

## 2022-09-20 PROCEDURE — 80061 LIPID PANEL: CPT | Performed by: PHYSICIAN ASSISTANT

## 2022-09-20 PROCEDURE — 91312 PR COVID VAC PFIZER BIVAL 30 MCG/0.3 ML IM: CPT | Performed by: PHYSICIAN ASSISTANT

## 2022-09-20 PROCEDURE — 82728 ASSAY OF FERRITIN: CPT | Performed by: PHYSICIAN ASSISTANT

## 2022-09-20 PROCEDURE — 0124A PR ADMIN COVID VAC PFIZER 12+ BIVAL ADDITIONAL: CPT | Performed by: PHYSICIAN ASSISTANT

## 2022-09-20 PROCEDURE — 36415 COLL VENOUS BLD VENIPUNCTURE: CPT | Performed by: PHYSICIAN ASSISTANT

## 2022-09-20 PROCEDURE — 99214 OFFICE O/P EST MOD 30 MIN: CPT | Mod: 25 | Performed by: PHYSICIAN ASSISTANT

## 2022-09-20 PROCEDURE — 82607 VITAMIN B-12: CPT | Performed by: PHYSICIAN ASSISTANT

## 2022-09-20 PROCEDURE — 90471 IMMUNIZATION ADMIN: CPT | Performed by: PHYSICIAN ASSISTANT

## 2022-09-20 PROCEDURE — G0103 PSA SCREENING: HCPCS | Performed by: PHYSICIAN ASSISTANT

## 2022-09-20 PROCEDURE — 85027 COMPLETE CBC AUTOMATED: CPT | Performed by: PHYSICIAN ASSISTANT

## 2022-09-20 RX ORDER — SIMVASTATIN 40 MG
TABLET ORAL
Qty: 90 TABLET | Refills: 3 | Status: SHIPPED | OUTPATIENT
Start: 2022-09-20 | End: 2023-08-27

## 2022-09-20 RX ORDER — BLOOD SUGAR DIAGNOSTIC
STRIP MISCELLANEOUS
Qty: 30 EACH | Refills: 1 | Status: SHIPPED | OUTPATIENT
Start: 2022-09-20 | End: 2023-03-20

## 2022-09-20 RX ORDER — FERROUS GLUCONATE 324(38)MG
324 TABLET ORAL
Qty: 100 TABLET | Refills: 3 | Status: SHIPPED | OUTPATIENT
Start: 2022-09-20 | End: 2023-09-18

## 2022-09-20 RX ORDER — LISINOPRIL 10 MG/1
10 TABLET ORAL DAILY
Qty: 90 TABLET | Refills: 3 | Status: SHIPPED | OUTPATIENT
Start: 2022-09-20 | End: 2023-11-21

## 2022-09-20 RX ORDER — HYDROCHLOROTHIAZIDE 12.5 MG/1
CAPSULE ORAL
Qty: 90 CAPSULE | Refills: 3 | Status: SHIPPED | OUTPATIENT
Start: 2022-09-20 | End: 2023-11-21

## 2022-09-20 RX ORDER — CYANOCOBALAMIN 1000 UG/ML
1000 INJECTION, SOLUTION INTRAMUSCULAR; SUBCUTANEOUS
Qty: 3 ML | Refills: 3 | Status: SHIPPED | OUTPATIENT
Start: 2022-09-20 | End: 2023-09-28

## 2022-09-20 RX ORDER — OMEPRAZOLE 40 MG/1
40 CAPSULE, DELAYED RELEASE ORAL DAILY
Qty: 90 CAPSULE | Refills: 3 | Status: SHIPPED | OUTPATIENT
Start: 2022-09-20 | End: 2023-10-30

## 2022-09-20 ASSESSMENT — ENCOUNTER SYMPTOMS
ARTHRALGIAS: 0
DIARRHEA: 0
DYSURIA: 0
CONSTIPATION: 0
HEADACHES: 0
MYALGIAS: 0
JOINT SWELLING: 0
HEARTBURN: 0
FREQUENCY: 0
EYE PAIN: 0
SHORTNESS OF BREATH: 0
DIZZINESS: 0
WEAKNESS: 0
FEVER: 0
COUGH: 0
PALPITATIONS: 0
NAUSEA: 0
NERVOUS/ANXIOUS: 0
HEMATOCHEZIA: 0
PARESTHESIAS: 0
ABDOMINAL PAIN: 0
CHILLS: 0
HEMATURIA: 0
SORE THROAT: 0

## 2022-09-20 ASSESSMENT — PAIN SCALES - GENERAL: PAINLEVEL: NO PAIN (0)

## 2022-09-20 NOTE — PROGRESS NOTES
Assessment & Plan       ICD-10-CM    1. Essential hypertension  I10 hydrochlorothiazide (MICROZIDE) 12.5 MG capsule     lisinopril (ZESTRIL) 10 MG tablet   2. Essential Hypercholesterolemia  E78.00 Lipid panel reflex to direct LDL Fasting     simvastatin (ZOCOR) 40 MG tablet     Lipid panel reflex to direct LDL Fasting   3. Vargas's esophagus with esophagitis  K22.70 omeprazole (PRILOSEC) 40 MG DR capsule    K20.90    4. Iron deficiency anemia, of uncertain etiology  D50.9 CBC with platelets     Ferritin     CBC with platelets     Ferritin   5. Male Erectile Disorder Due To Physical Condition  N52.9    6. Vitamin B12 deficiency  E53.8 Vitamin B12     insulin pen needle (ADVOCATE INSULIN PEN NEEDLES) 29G X 12.7MM miscellaneous     Vitamin B12   7. Screening for prostate cancer  Z12.5 PSA, screen     PSA, screen   8. Restless leg syndrome  G25.81 ferrous gluconate (FERGON) 324 (38 Fe) MG tablet   9. Vitamin B-complex deficiency  E53.9 cyanocobalamin (CYANOCOBALAMIN) 1000 MCG/ML injection   10. Encounter for immunization  Z23 COVID-19 mRNA BIVALENT vaccine (PFIZER BOOSTER) 30 MCG/0.3ML injection 30 mcg   11. Alcohol abuse  F10.10    12. History of colon cancer  Z85.038    13. Pancreatic mass  K86.89      #1 hypertension-blood pressure was 142/72 today.  He is currently on lisinopril 10 mg daily and hydrochlorothiazide 12.5 mg daily.  I did discuss with him watching his salt and alcohol intake.  I did recommend having him monitor his blood pressure a couple times a week.  If this continues to increase he is to let me know and we can increase his medications.  He is otherwise asymptomatic today.    #2 hyperlipidemia-currently on simvastatin 40 mg daily.  He is due for a lipid panel and we will check this today.  Diet and exercise discussed    #3 iron deficiency anemia-last ferritin was 134 up from 19.  He did discontinue iron supplements but restarted about 2 weeks ago.  I did recommend restarting iron supplements and  "we will check a ferritin level today.  We will also check a CBC.    #4 Vargas's esophagus-on omeprazole.  Negative biopsy 1/2022.  Next EGD recommended January 2025.  He does follow closely with gastroenterology.    #5 B12 injections-he is in need of a B12 and we will check this today.  He is doing B12 injections once a month.    #6 alcohol use-we did discuss this and he is to cut back to prevent worsening GI bleeds or any other concerns.  This will also improve his blood pressure.    #7 erectile dysfunction uses Viagra as needed.  Stable.    #8 colon cancer screening-she is up-to-date.  Next colonoscopy is 4/2024.    #9 prostate cancer screening-she is due for a PSA and we will check this today.    #10 pancreatic cyst-he did undergo an MRI of his abdomen 8/2022.  This did show a pancreatic mass at the tail measuring 1.5cm which is slow-growing.  Did recommend rechecking MRI in 6 months to 1 year.  He states that this is being followed by gastroenterology.    #11 immunizations-will update COVID booster and flu shot today.     #12 history of colon cancer-he states that he did have colon cancer 2021.  He did undergo resection.  He does follow closely with gastroenterology.  No new symptoms today.}     BMI:   Estimated body mass index is 33.95 kg/m  as calculated from the following:    Height as of 3/25/22: 1.727 m (5' 8\").    Weight as of this encounter: 101.3 kg (223 lb 4.8 oz).   Weight management plan: Discussed healthy diet and exercise guidelines        No follow-ups on file.   Follow-up Visit   Expected date:  Mar 20, 2023 (Approximate)      Follow Up Appointment Details:     Follow-up with whom?: Me    Follow-Up for what?: Adult Preventive    Any Additional Chronic Condition Management?: General (Other)    How?: In Person                    [facility]    Mark Pisano is a 62 year old, presenting for the following health issues:  Establish Care and " Recheck Medication (B12 injection needles may need a new RX. Iron supplement restarted by pt. )      Norris is a pleasant 62-year-old male that presents to clinic today for establish care visit.  He has a past medical history of hypertension, hyperlipidemia, B12 deficiency, iron deficiency anemia, Vargas's esophagus, erectile dysfunction, alcohol use, and more recently a pancreatic mass found on MRI.  He is feeling well today.  No concerns regarding his chronic health.    He has been on lisinopril 10 mg daily and hydrochlorothiazide 12.5 mg for hypertension control.  Blood pressure is a little elevated today but he states that he does not watch his salt intake.  He does indulge in beer on occasion.    He is currently on omeprazole for GERD and Vargas's esophagus.  He did have an EGD with biopsy which was not negative.  Next EGD is recommended January 2025.    He does have a history of iron deficiency anemia and was on iron supplements.  This was stopped in 3/2022.  He did undergo a work-up with colonoscopy and EGD October 2021.  He states that he is having more leg pain which she was having when he was not on iron supplements.  He states he is having numbness and tingling in his legs.  He also donates blood and his iron levels have been low.    He does have underlying hyperlipidemia and is currently on simvastatin 40 mg daily.    History of Present Illness       Reason for visit:  Meet doctor   6 month check up    He eats 2-3 servings of fruits and vegetables daily.He consumes 1 sweetened beverage(s) daily.He exercises with enough effort to increase his heart rate 9 or less minutes per day.  He exercises with enough effort to increase his heart rate 3 or less days per week.   He is taking medications regularly.           Review of Systems   Constitutional: Negative for chills and fever.   HENT: Negative for congestion, ear pain, hearing loss and sore throat.    Eyes: Negative for pain and visual disturbance.    Respiratory: Negative for cough and shortness of breath.    Cardiovascular: Negative for chest pain, palpitations and peripheral edema.   Gastrointestinal: Negative for abdominal pain, constipation, diarrhea, heartburn, hematochezia and nausea.   Genitourinary: Negative for dysuria, frequency, genital sores, hematuria, impotence, penile discharge and urgency.   Musculoskeletal: Negative for arthralgias, joint swelling and myalgias.   Skin: Negative for rash.   Neurological: Negative for dizziness, weakness, headaches and paresthesias.   Psychiatric/Behavioral: Negative for mood changes. The patient is not nervous/anxious.             Objective    BP (!) 142/72   Pulse 85   Wt 101.3 kg (223 lb 4.8 oz)   SpO2 98%   BMI 33.95 kg/m    Body mass index is 33.95 kg/m .  Physical Exam  Constitutional:       General: He is not in acute distress.     Appearance: He is well-developed.   HENT:      Right Ear: Tympanic membrane and external ear normal.      Left Ear: Tympanic membrane and external ear normal.      Nose: Nose normal.      Mouth/Throat:      Mouth: No oral lesions.      Pharynx: No oropharyngeal exudate.   Eyes:      General:         Right eye: No discharge.         Left eye: No discharge.      Conjunctiva/sclera: Conjunctivae normal.      Pupils: Pupils are equal, round, and reactive to light.   Neck:      Thyroid: No thyromegaly.      Trachea: No tracheal deviation.   Cardiovascular:      Rate and Rhythm: Normal rate and regular rhythm.      Pulses: Normal pulses.      Heart sounds: Normal heart sounds, S1 normal and S2 normal. No murmur heard.    No S3 or S4 sounds.   Pulmonary:      Effort: Pulmonary effort is normal. No respiratory distress.      Breath sounds: Normal breath sounds. No wheezing or rales.   Abdominal:      General: Bowel sounds are normal.      Palpations: Abdomen is soft. There is no mass.      Tenderness: There is no abdominal tenderness.   Musculoskeletal:         General: No  deformity. Normal range of motion.      Cervical back: Neck supple.   Lymphadenopathy:      Cervical: No cervical adenopathy.   Skin:     General: Skin is warm and dry.      Findings: No lesion or rash.   Neurological:      Mental Status: He is alert and oriented to person, place, and time.      Motor: No abnormal muscle tone.      Deep Tendon Reflexes: Reflexes are normal and symmetric.   Psychiatric:         Speech: Speech normal.         Thought Content: Thought content normal.         Judgment: Judgment normal.

## 2022-09-22 DIAGNOSIS — E53.8 VITAMIN B12 DEFICIENCY: Primary | ICD-10-CM

## 2022-09-22 NOTE — TELEPHONE ENCOUNTER
"Routing refill request to provider for review/approval because:  Drug not on the [facility] refill protocol     Last Written Prescription Date:  3/23/15  Last Fill Quantity: ,  # refills:    Last office visit provider:  22     Requested Prescriptions   Pending Prescriptions Disp Refills     Syringe/Needle (Disp) 20G X 1-1/2\" 3 ML MISC 50 each 0     Si Syringe every 30 days       There is no refill protocol information for this order          Rika Nelson, RN 22 1:02 PM  "

## 2022-09-22 NOTE — TELEPHONE ENCOUNTER
"Refill Request  Medication name: Pending Prescriptions:                       Disp   Refills    Syringe/Needle (Disp) 20G X 1-1/2\" 3 ML M*50 each0            Si Syringe every 30 days    Requested Pharmacy: [facility]     Patient needs syringes for B12 injections.   "

## 2023-01-19 ENCOUNTER — MYC MEDICAL ADVICE (OUTPATIENT)
Dept: FAMILY MEDICINE | Facility: CLINIC | Age: 63
End: 2023-01-19
Payer: MEDICARE

## 2023-01-20 ENCOUNTER — TRANSFERRED RECORDS (OUTPATIENT)
Dept: HEALTH INFORMATION MANAGEMENT | Facility: CLINIC | Age: 63
End: 2023-01-20

## 2023-01-25 NOTE — PROGRESS NOTES
Please let Norris know his stress echo was normal.  Detail Level: Generalized Benzoyl Peroxide Counseling: Patient counseled that medicine may cause skin irritation and bleach clothing.  In the event of skin irritation, the patient was advised to reduce the amount of the drug applied or use it less frequently.   The patient verbalized understanding of the proper use and possible adverse effects of benzoyl peroxide.  All of the patient's questions and concerns were addressed. Spironolactone Pregnancy And Lactation Text: This medication can cause feminization of the male fetus and should be avoided during pregnancy. The active metabolite is also found in breast milk. Detail Level: Zone Topical Retinoid counseling:  Patient advised to apply a pea-sized amount only at bedtime and wait 30 minutes after washing their face before applying.  If too drying, patient may add a non-comedogenic moisturizer. The patient verbalized understanding of the proper use and possible adverse effects of retinoids.  All of the patient's questions and concerns were addressed. Topical Sulfur Applications Pregnancy And Lactation Text: This medication is Pregnancy Category C and has an unknown safety profile during pregnancy. It is unknown if this topical medication is excreted in breast milk. Tetracycline Pregnancy And Lactation Text: This medication is Pregnancy Category D and not consider safe during pregnancy. It is also excreted in breast milk. High Dose Vitamin A Counseling: Side effects reviewed, pt to contact office should one occur. Azithromycin Counseling:  I discussed with the patient the risks of azithromycin including but not limited to GI upset, allergic reaction, drug rash, diarrhea, and yeast infections. Dapsone Pregnancy And Lactation Text: This medication is Pregnancy Category C and is not considered safe during pregnancy or breast feeding. Doxycycline Pregnancy And Lactation Text: This medication is Pregnancy Category D and not consider safe during pregnancy. It is also excreted in breast milk but is considered safe for shorter treatment courses. Minocycline Counseling: Patient advised regarding possible photosensitivity and discoloration of the teeth, skin, lips, tongue and gums.  Patient instructed to avoid sunlight, if possible.  When exposed to sunlight, patients should wear protective clothing, sunglasses, and sunscreen.  The patient was instructed to call the office immediately if the following severe adverse effects occur:  hearing changes, easy bruising/bleeding, severe headache, or vision changes.  The patient verbalized understanding of the proper use and possible adverse effects of minocycline.  All of the patient's questions and concerns were addressed. Bactrim Counseling:  I discussed with the patient the risks of sulfa antibiotics including but not limited to GI upset, allergic reaction, drug rash, diarrhea, dizziness, photosensitivity, and yeast infections.  Rarely, more serious reactions can occur including but not limited to aplastic anemia, agranulocytosis, methemoglobinemia, blood dyscrasias, liver or kidney failure, lung infiltrates or desquamative/blistering drug rashes. Bactrim Pregnancy And Lactation Text: This medication is Pregnancy Category D and is known to cause fetal risk.  It is also excreted in breast milk. Isotretinoin Counseling: Patient should get monthly blood tests, not donate blood, not drive at night if vision affected, not share medication, and not undergo elective surgery for 6 months after tx completed. Side effects reviewed, pt to contact office should one occur. Tazorac Pregnancy And Lactation Text: This medication is not safe during pregnancy. It is unknown if this medication is excreted in breast milk. Azelaic Acid Counseling: Patient counseled that medicine may cause skin irritation and to avoid applying near the eyes.  In the event of skin irritation, the patient was advised to reduce the amount of the drug applied or use it less frequently.   The patient verbalized understanding of the proper use and possible adverse effects of azelaic acid.  All of the patient's questions and concerns were addressed. Erythromycin Counseling:  I discussed with the patient the risks of erythromycin including but not limited to GI upset, allergic reaction, drug rash, diarrhea, increase in liver enzymes, and yeast infections. Include Pregnancy/Lactation Warning?: No Birth Control Pills Pregnancy And Lactation Text: This medication should be avoided if pregnant and for the first 30 days post-partum. Topical Clindamycin Pregnancy And Lactation Text: This medication is Pregnancy Category B and is considered safe during pregnancy. It is unknown if it is excreted in breast milk. Benzoyl Peroxide Pregnancy And Lactation Text: This medication is Pregnancy Category C. It is unknown if benzoyl peroxide is excreted in breast milk. Topical Sulfur Applications Counseling: Topical Sulfur Counseling: Patient counseled that this medication may cause skin irritation or allergic reactions.  In the event of skin irritation, the patient was advised to reduce the amount of the drug applied or use it less frequently.   The patient verbalized understanding of the proper use and possible adverse effects of topical sulfur application.  All of the patient's questions and concerns were addressed. High Dose Vitamin A Pregnancy And Lactation Text: High dose vitamin A therapy is contraindicated during pregnancy and breast feeding. Tetracycline Counseling: Patient counseled regarding possible photosensitivity and increased risk for sunburn.  Patient instructed to avoid sunlight, if possible.  When exposed to sunlight, patients should wear protective clothing, sunglasses, and sunscreen.  The patient was instructed to call the office immediately if the following severe adverse effects occur:  hearing changes, easy bruising/bleeding, severe headache, or vision changes.  The patient verbalized understanding of the proper use and possible adverse effects of tetracycline.  All of the patient's questions and concerns were addressed. Patient understands to avoid pregnancy while on therapy due to potential birth defects. Doxycycline Counseling:  Patient counseled regarding possible photosensitivity and increased risk for sunburn.  Patient instructed to avoid sunlight, if possible.  When exposed to sunlight, patients should wear protective clothing, sunglasses, and sunscreen.  The patient was instructed to call the office immediately if the following severe adverse effects occur:  hearing changes, easy bruising/bleeding, severe headache, or vision changes.  The patient verbalized understanding of the proper use and possible adverse effects of doxycycline.  All of the patient's questions and concerns were addressed. Azithromycin Pregnancy And Lactation Text: This medication is considered safe during pregnancy and is also secreted in breast milk. Topical Retinoid Pregnancy And Lactation Text: This medication is Pregnancy Category C. It is unknown if this medication is excreted in breast milk. Winlevi Counseling:  I discussed with the patient the risks of topical clascoterone including but not limited to erythema, scaling, itching, and stinging. Patient voiced their understanding. Aklief counseling:  Patient advised to apply a pea-sized amount only at bedtime and wait 30 minutes after washing their face before applying.  If too drying, patient may add a non-comedogenic moisturizer.  The most commonly reported side effects including irritation, redness, scaling, dryness, stinging, burning, itching, and increased risk of sunburn.  The patient verbalized understanding of the proper use and possible adverse effects of retinoids.  All of the patient's questions and concerns were addressed. Tazorac Counseling:  Patient advised that medication is irritating and drying.  Patient may need to apply sparingly and wash off after an hour before eventually leaving it on overnight.  The patient verbalized understanding of the proper use and possible adverse effects of tazorac.  All of the patient's questions and concerns were addressed. Winlevi Pregnancy And Lactation Text: This medication is considered safe during pregnancy and breastfeeding. Aklief Pregnancy And Lactation Text: It is unknown if this medication is safe to use during pregnancy.  It is unknown if this medication is excreted in breast milk.  Breastfeeding women should use the topical cream on the smallest area of the skin for the shortest time needed while breastfeeding.  Do not apply to nipple and areola. Erythromycin Pregnancy And Lactation Text: This medication is Pregnancy Category B and is considered safe during pregnancy. It is also excreted in breast milk. Sarecycline Counseling: Patient advised regarding possible photosensitivity and discoloration of the teeth, skin, lips, tongue and gums.  Patient instructed to avoid sunlight, if possible.  When exposed to sunlight, patients should wear protective clothing, sunglasses, and sunscreen.  The patient was instructed to call the office immediately if the following severe adverse effects occur:  hearing changes, easy bruising/bleeding, severe headache, or vision changes.  The patient verbalized understanding of the proper use and possible adverse effects of sarecycline.  All of the patient's questions and concerns were addressed. Birth Control Pills Counseling: Birth Control Pill Counseling: I discussed with the patient the potential side effects of OCPs including but not limited to increased risk of stroke, heart attack, thrombophlebitis, deep venous thrombosis, hepatic adenomas, breast changes, GI upset, headaches, and depression.  The patient verbalized understanding of the proper use and possible adverse effects of OCPs. All of the patient's questions and concerns were addressed. Isotretinoin Pregnancy And Lactation Text: This medication is Pregnancy Category X and is considered extremely dangerous during pregnancy. It is unknown if it is excreted in breast milk. Dapsone Counseling: I discussed with the patient the risks of dapsone including but not limited to hemolytic anemia, agranulocytosis, rashes, methemoglobinemia, kidney failure, peripheral neuropathy, headaches, GI upset, and liver toxicity.  Patients who start dapsone require monitoring including baseline LFTs and weekly CBCs for the first month, then every month thereafter.  The patient verbalized understanding of the proper use and possible adverse effects of dapsone.  All of the patient's questions and concerns were addressed. Topical Clindamycin Counseling: Patient counseled that this medication may cause skin irritation or allergic reactions.  In the event of skin irritation, the patient was advised to reduce the amount of the drug applied or use it less frequently.   The patient verbalized understanding of the proper use and possible adverse effects of clindamycin.  All of the patient's questions and concerns were addressed. Azelaic Acid Pregnancy And Lactation Text: This medication is considered safe during pregnancy and breast feeding. Spironolactone Counseling: Patient advised regarding risks of diarrhea, abdominal pain, hyperkalemia, birth defects (for female patients), liver toxicity and renal toxicity. The patient may need blood work to monitor liver and kidney function and potassium levels while on therapy. The patient verbalized understanding of the proper use and possible adverse effects of spironolactone.  All of the patient's questions and concerns were addressed.

## 2023-02-04 ENCOUNTER — HEALTH MAINTENANCE LETTER (OUTPATIENT)
Age: 63
End: 2023-02-04

## 2023-02-14 ENCOUNTER — MYC MEDICAL ADVICE (OUTPATIENT)
Dept: FAMILY MEDICINE | Facility: CLINIC | Age: 63
End: 2023-02-14
Payer: MEDICARE

## 2023-02-14 ENCOUNTER — NURSE TRIAGE (OUTPATIENT)
Dept: FAMILY MEDICINE | Facility: CLINIC | Age: 63
End: 2023-02-14
Payer: MEDICARE

## 2023-02-14 NOTE — TELEPHONE ENCOUNTER
Patient called in with concerns of bilateral numbness/ tingling to feet.   No trouble walking. Has chronic back problems and is wondering if its his sciatic nerve. He has occasional sob that is new. He has no wheezing, cough, chest pain or pressure. No recent injury or illness. Has history of low B12. Appt scheduled for 2/17 with Keanu Álvarez      Reason for Disposition    Numbness or tingling on both sides of body and is a new symptom lasting > 24 hours    Additional Information    Negative: Difficult to awaken or acting confused (e.g., disoriented, slurred speech)    Negative: New neurologic deficit that is present NOW, sudden onset of ANY of the following: * Weakness of the face, arm, or leg on one side of the body* Numbness of the face, arm, or leg on one side of the body* Loss of speech or garbled speech    Negative: Sounds like a life-threatening emergency to the triager    Negative: Confusion, disorientation, or hallucinations is main symptom    Negative: Dizziness is main symptom    Negative: Followed a head injury within last 3 days    Negative: Headache (with neurologic deficit)    Negative: Unable to urinate (or only a few drops) and bladder feels very full    Negative: Loss of bladder or bowel control (urine or bowel incontinence; wetting self, leaking stool) of new-onset    Negative: Back pain with numbness (loss of sensation) in groin or rectal area    Negative: Neurologic deficit that was brief (now gone), ANY of the following: * Weakness of the face, arm, or leg on one side of the body * Numbness of the face, arm, or leg on one side of the body * Loss of speech or garbled speech    Negative: Patient sounds very sick or weak to the triager    Negative: Neurologic deficit of gradual onset (e.g., days to weeks), ANY of the following: * Weakness of the face, arm, or leg on one side of the body* Numbness of the face, arm, or leg on one side of the body* Loss of speech or garbled speech    Negative:  "Willis palsy suspected (i.e., weakness only one side of the face, developing over hours to days, no other symptoms)    Negative: Tingling (e.g., pins and needles) of the face, arm or leg on one side of the body, that is present now (Exceptions: Chronic or recurrent symptom lasting > 4 weeks; or tingling from known cause, such as: bumped elbow, carpal tunnel syndrome, pinched nerve, frostbite.)    Answer Assessment - Initial Assessment Questions  1. SYMPTOM: \"What is the main symptom you are concerned about?\" (e.g., weakness, numbness)        Tingling   Wake up in the morning with the tingling, numbness     2. ONSET: \"When did this start?\" (minutes, hours, days; while sleeping)      Last couple of weeks  3. LAST NORMAL: \"When was the last time you (the patient) were normal (no symptoms)?\"      Not getting worse, but getting better.  When walkin around doesn't feel it, not thinking about it. When sitting it feels tingly or numb.    4. PATTERN \"Does this come and go, or has it been constant since it started?\"  \"Is it present now?\"      Comes and goes, happening now   5. CARDIAC SYMPTOMS: \"Have you had any of the following symptoms: chest pain, difficulty breathing, palpitations?\"      No   Checked /80  6. NEUROLOGIC SYMPTOMS: \"Have you had any of the following symptoms: headache, dizziness, vision loss, double vision, changes in speech, unsteady on your feet?\"      No  7. OTHER SYMPTOMS: \"Do you have any other symptoms?\"      no  8. PREGNANCY: \"Is there any chance you are pregnant?\" \"When was your last menstrual period?\"      no    Protocols used: NEUROLOGIC DEFICIT-A-[place]      "

## 2023-02-17 ENCOUNTER — OFFICE VISIT (OUTPATIENT)
Dept: FAMILY MEDICINE | Facility: CLINIC | Age: 63
End: 2023-02-17
Payer: COMMERCIAL

## 2023-02-17 VITALS
SYSTOLIC BLOOD PRESSURE: 130 MMHG | HEART RATE: 81 BPM | OXYGEN SATURATION: 96 % | BODY MASS INDEX: 34.56 KG/M2 | WEIGHT: 228 LBS | DIASTOLIC BLOOD PRESSURE: 72 MMHG | TEMPERATURE: 98.2 F | HEIGHT: 68 IN | RESPIRATION RATE: 12 BRPM

## 2023-02-17 DIAGNOSIS — M54.41 CHRONIC BILATERAL LOW BACK PAIN WITH BILATERAL SCIATICA: ICD-10-CM

## 2023-02-17 DIAGNOSIS — G89.29 CHRONIC BILATERAL LOW BACK PAIN WITH BILATERAL SCIATICA: ICD-10-CM

## 2023-02-17 DIAGNOSIS — D50.0 IRON DEFICIENCY ANEMIA DUE TO CHRONIC BLOOD LOSS: ICD-10-CM

## 2023-02-17 DIAGNOSIS — M54.42 CHRONIC BILATERAL LOW BACK PAIN WITH BILATERAL SCIATICA: ICD-10-CM

## 2023-02-17 DIAGNOSIS — E53.8 VITAMIN B12 DEFICIENCY (NON ANEMIC): ICD-10-CM

## 2023-02-17 DIAGNOSIS — R20.2 PARESTHESIA: Primary | ICD-10-CM

## 2023-02-17 LAB
CK SERPL-CCNC: 204 U/L (ref 39–308)
CRP SERPL-MCNC: <3 MG/L
ERYTHROCYTE [DISTWIDTH] IN BLOOD BY AUTOMATED COUNT: 14.4 % (ref 10–15)
ERYTHROCYTE [SEDIMENTATION RATE] IN BLOOD BY WESTERGREN METHOD: 7 MM/HR (ref 0–15)
FERRITIN SERPL-MCNC: 19 NG/ML (ref 31–409)
HBA1C MFR BLD: 5.8 % (ref 0–5.6)
HCT VFR BLD AUTO: 42.3 % (ref 40–53)
HGB BLD-MCNC: 14 G/DL (ref 13.3–17.7)
IRON BINDING CAPACITY (ROCHE): 470 UG/DL (ref 240–430)
IRON SATN MFR SERPL: 45 % (ref 15–46)
IRON SERPL-MCNC: 211 UG/DL (ref 61–157)
MCH RBC QN AUTO: 27.7 PG (ref 26.5–33)
MCHC RBC AUTO-ENTMCNC: 33.1 G/DL (ref 31.5–36.5)
MCV RBC AUTO: 84 FL (ref 78–100)
PLATELET # BLD AUTO: 217 10E3/UL (ref 150–450)
RBC # BLD AUTO: 5.06 10E6/UL (ref 4.4–5.9)
TSH SERPL DL<=0.005 MIU/L-ACNC: 1.45 UIU/ML (ref 0.3–4.2)
VIT B12 SERPL-MCNC: 502 PG/ML (ref 232–1245)
WBC # BLD AUTO: 6.1 10E3/UL (ref 4–11)

## 2023-02-17 PROCEDURE — 99214 OFFICE O/P EST MOD 30 MIN: CPT | Performed by: PHYSICIAN ASSISTANT

## 2023-02-17 PROCEDURE — 83540 ASSAY OF IRON: CPT | Performed by: PHYSICIAN ASSISTANT

## 2023-02-17 PROCEDURE — 83036 HEMOGLOBIN GLYCOSYLATED A1C: CPT | Performed by: PHYSICIAN ASSISTANT

## 2023-02-17 PROCEDURE — 36415 COLL VENOUS BLD VENIPUNCTURE: CPT | Performed by: PHYSICIAN ASSISTANT

## 2023-02-17 PROCEDURE — 82550 ASSAY OF CK (CPK): CPT | Performed by: PHYSICIAN ASSISTANT

## 2023-02-17 PROCEDURE — 85652 RBC SED RATE AUTOMATED: CPT | Performed by: PHYSICIAN ASSISTANT

## 2023-02-17 PROCEDURE — 85027 COMPLETE CBC AUTOMATED: CPT | Performed by: PHYSICIAN ASSISTANT

## 2023-02-17 PROCEDURE — 82607 VITAMIN B-12: CPT | Performed by: PHYSICIAN ASSISTANT

## 2023-02-17 PROCEDURE — 83550 IRON BINDING TEST: CPT | Performed by: PHYSICIAN ASSISTANT

## 2023-02-17 PROCEDURE — 82728 ASSAY OF FERRITIN: CPT | Performed by: PHYSICIAN ASSISTANT

## 2023-02-17 PROCEDURE — 86140 C-REACTIVE PROTEIN: CPT | Performed by: PHYSICIAN ASSISTANT

## 2023-02-17 PROCEDURE — 84443 ASSAY THYROID STIM HORMONE: CPT | Performed by: PHYSICIAN ASSISTANT

## 2023-02-17 ASSESSMENT — ENCOUNTER SYMPTOMS
FEVER: 0
LIGHT-HEADEDNESS: 0
PALPITATIONS: 0
WEAKNESS: 1
CHILLS: 0
NUMBNESS: 1
COUGH: 0
GASTROINTESTINAL NEGATIVE: 1
BACK PAIN: 1
SHORTNESS OF BREATH: 0
ENDOCRINE NEGATIVE: 1
FATIGUE: 0
WHEEZING: 0

## 2023-02-17 NOTE — PROGRESS NOTES
Assessment & Plan     Paresthesia  Paresthesia to his hands,, arms legs, and feet.  Ongoing for 2 to 3 weeks.  Negative neurological exam today.  No neurological deficits noted.  Since this is bilaterally I do not suspect stroke as the contributing factor.  He does have underlying B12 deficiency and iron deficiency so this could be contributing to his symptoms.  We will rule out thyroid dysfunction and diabetes.  We will do further evaluation including a CBC, iron, ferritin, B12, A1c, TSH, CK, CRP and a sed rate.  Neurology referral based on lab work today.  - CBC with platelets  - Iron and iron binding capacity  - Ferritin  - Vitamin B12  - Hemoglobin A1c  - TSH  - CK total  - ESR: Erythrocyte sedimentation rate  - CRP, inflammation    B12 deficiency  Check B12 level.  Continue with B12 injections in the meantime    Iron deficiency anemia  We will check iron and CBC.    Chronic lower back pain  Does have chronic lower back pain.  He is having some numbness and tingling in his right foot but not specifically his right toe.  Could be due to spinal stenosis in his lumbar region.  We will start with labs and if there is no clear etiology of his paresthesia we can move forward with getting further imaging.        No follow-ups on file.    [facility]    Subjective   Norris is a 63 year old, presenting for the following health issues:  Numbness (Numbness and tingling all over. Takes B12 injections monthly. Pt also notes R foot numbness. )      Norris is a pleasant 63-year-old male who presents to the clinic today for evaluation on paresthesia.  The last 2 or 3 weeks he has noticed 70 minutes and tingling to his hands, arms, legs and right foot.  He states that he tends to know this this worse with activity and throughout the day.  It is not causing any issues with him sleeping.  He also notes some mild weakness to his hands at times.  He does have a history of iron  "deficiency and B12 deficiency.  He is currently on B12 injections once a month and recently restarted iron supplements last September.  He notes that he has some numbness and tingling in his right foot more so the forefoot and the sole.  He does have chronic lower back pain which has not changed significantly.  He has not had any chest pain shortness of breath lightheaded or dizziness.  Change in neurological status    Numbness  Associated symptoms include numbness and weakness (hands). Pertinent negatives include no chest pain, chills, coughing, fatigue or fever.   History of Present Illness       Reason for visit:  Leg and arm discomfort  Symptom onset:  1-2 weeks ago  Symptoms include:  Tingling-numbness  Symptom intensity:  Mild  Symptom progression:  Staying the same  Had these symptoms before:  No    He eats 0-1 servings of fruits and vegetables daily.He consumes 1 sweetened beverage(s) daily.He exercises with enough effort to increase his heart rate 20 to 29 minutes per day.  He exercises with enough effort to increase his heart rate 3 or less days per week.   He is taking medications regularly.       Review of Systems   Constitutional: Negative for chills, fatigue and fever.   HENT: Negative.    Respiratory: Negative for cough, shortness of breath and wheezing.    Cardiovascular: Negative for chest pain and palpitations.   Gastrointestinal: Negative.    Endocrine: Negative.    Genitourinary: Negative.    Musculoskeletal: Positive for back pain (lower b/l chronic. No new symptoms).   Neurological: Positive for weakness (hands) and numbness. Negative for light-headedness.            Objective    /72 (BP Location: Right arm, Patient Position: Sitting, Cuff Size: Adult Regular)   Pulse 81   Temp 98.2  F (36.8  C) (Oral)   Resp 12   Ht 1.727 m (5' 8\")   Wt 103.4 kg (228 lb)   SpO2 96%   BMI 34.67 kg/m    Body mass index is 34.67 kg/m .     Physical Exam  Vitals and nursing note reviewed. "   Constitutional:       Appearance: Normal appearance.   HENT:      Head: Normocephalic and atraumatic.      Right Ear: Tympanic membrane, ear canal and external ear normal.      Left Ear: Tympanic membrane, ear canal and external ear normal.      Mouth/Throat:      Mouth: Mucous membranes are moist.      Pharynx: No oropharyngeal exudate or posterior oropharyngeal erythema.   Eyes:      General:         Right eye: No discharge.         Left eye: No discharge.      Conjunctiva/sclera: Conjunctivae normal.   Cardiovascular:      Rate and Rhythm: Normal rate and regular rhythm.      Heart sounds: No murmur heard.    No friction rub. No gallop.   Pulmonary:      Effort: Pulmonary effort is normal.      Breath sounds: No wheezing or rhonchi.   Skin:     General: Skin is warm.      Coloration: Skin is not jaundiced.   Neurological:      General: No focal deficit present.      Mental Status: He is alert and oriented to person, place, and time.      GCS: GCS eye subscore is 4. GCS verbal subscore is 5. GCS motor subscore is 6.      Cranial Nerves: Cranial nerves 2-12 are intact. No cranial nerve deficit.      Motor: No weakness.      Gait: Gait normal.      Deep Tendon Reflexes:      Reflex Scores:       Patellar reflexes are 2+ on the right side and 2+ on the left side.

## 2023-02-22 ENCOUNTER — TRANSFERRED RECORDS (OUTPATIENT)
Dept: HEALTH INFORMATION MANAGEMENT | Facility: CLINIC | Age: 63
End: 2023-02-22

## 2023-03-17 ASSESSMENT — ENCOUNTER SYMPTOMS
PARESTHESIAS: 0
HEADACHES: 0
DIARRHEA: 0
CHILLS: 0
EYE PAIN: 0
FREQUENCY: 0
COUGH: 0
NAUSEA: 0
HEMATURIA: 0
JOINT SWELLING: 0
ARTHRALGIAS: 1
HEARTBURN: 0
MYALGIAS: 0
SHORTNESS OF BREATH: 0
DYSURIA: 0
PALPITATIONS: 0
CONSTIPATION: 0
NERVOUS/ANXIOUS: 0
WEAKNESS: 0
FEVER: 0
ABDOMINAL PAIN: 0
SORE THROAT: 0
DIZZINESS: 0
HEMATOCHEZIA: 0

## 2023-03-17 ASSESSMENT — ACTIVITIES OF DAILY LIVING (ADL): CURRENT_FUNCTION: NO ASSISTANCE NEEDED

## 2023-03-20 ENCOUNTER — OFFICE VISIT (OUTPATIENT)
Dept: FAMILY MEDICINE | Facility: CLINIC | Age: 63
End: 2023-03-20
Attending: PHYSICIAN ASSISTANT
Payer: MEDICARE

## 2023-03-20 VITALS
BODY MASS INDEX: 34.86 KG/M2 | WEIGHT: 230 LBS | HEART RATE: 80 BPM | HEIGHT: 68 IN | OXYGEN SATURATION: 97 % | DIASTOLIC BLOOD PRESSURE: 80 MMHG | SYSTOLIC BLOOD PRESSURE: 130 MMHG | RESPIRATION RATE: 20 BRPM | TEMPERATURE: 97.4 F

## 2023-03-20 DIAGNOSIS — I10 ESSENTIAL HYPERTENSION: ICD-10-CM

## 2023-03-20 DIAGNOSIS — N52.9 IMPOTENCE OF ORGANIC ORIGIN: ICD-10-CM

## 2023-03-20 DIAGNOSIS — K22.70 BARRETT'S ESOPHAGUS WITH ESOPHAGITIS: ICD-10-CM

## 2023-03-20 DIAGNOSIS — K86.2 CYST OF PANCREAS: ICD-10-CM

## 2023-03-20 DIAGNOSIS — K20.90 BARRETT'S ESOPHAGUS WITH ESOPHAGITIS: ICD-10-CM

## 2023-03-20 DIAGNOSIS — D50.0 IRON DEFICIENCY ANEMIA DUE TO CHRONIC BLOOD LOSS: ICD-10-CM

## 2023-03-20 DIAGNOSIS — D50.9 IRON DEFICIENCY ANEMIA, UNSPECIFIED IRON DEFICIENCY ANEMIA TYPE: ICD-10-CM

## 2023-03-20 DIAGNOSIS — E78.00 PURE HYPERCHOLESTEROLEMIA: ICD-10-CM

## 2023-03-20 DIAGNOSIS — R20.2 PARESTHESIA: ICD-10-CM

## 2023-03-20 DIAGNOSIS — G25.81 RESTLESS LEG SYNDROME: ICD-10-CM

## 2023-03-20 DIAGNOSIS — E53.8 VITAMIN B12 DEFICIENCY (NON ANEMIC): ICD-10-CM

## 2023-03-20 DIAGNOSIS — Z85.038 HISTORY OF COLON CANCER: ICD-10-CM

## 2023-03-20 DIAGNOSIS — Z12.5 SCREENING PSA (PROSTATE SPECIFIC ANTIGEN): ICD-10-CM

## 2023-03-20 DIAGNOSIS — Z00.00 MEDICARE ANNUAL WELLNESS VISIT, INITIAL: Primary | ICD-10-CM

## 2023-03-20 LAB
ALBUMIN SERPL BCG-MCNC: 4.6 G/DL (ref 3.5–5.2)
ALP SERPL-CCNC: 80 U/L (ref 40–129)
ALT SERPL W P-5'-P-CCNC: 40 U/L (ref 10–50)
ANION GAP SERPL CALCULATED.3IONS-SCNC: 12 MMOL/L (ref 7–15)
AST SERPL W P-5'-P-CCNC: 35 U/L (ref 10–50)
BILIRUB SERPL-MCNC: 0.3 MG/DL
BUN SERPL-MCNC: 10.2 MG/DL (ref 8–23)
CALCIUM SERPL-MCNC: 10 MG/DL (ref 8.8–10.2)
CHLORIDE SERPL-SCNC: 100 MMOL/L (ref 98–107)
CREAT SERPL-MCNC: 0.77 MG/DL (ref 0.67–1.17)
DEPRECATED HCO3 PLAS-SCNC: 26 MMOL/L (ref 22–29)
GFR SERPL CREATININE-BSD FRML MDRD: >90 ML/MIN/1.73M2
GLUCOSE SERPL-MCNC: 103 MG/DL (ref 70–99)
IRON BINDING CAPACITY (ROCHE): 456 UG/DL (ref 240–430)
IRON SATN MFR SERPL: 34 % (ref 15–46)
IRON SERPL-MCNC: 155 UG/DL (ref 61–157)
POTASSIUM SERPL-SCNC: 4.4 MMOL/L (ref 3.4–5.3)
PROT SERPL-MCNC: 7.7 G/DL (ref 6.4–8.3)
SODIUM SERPL-SCNC: 138 MMOL/L (ref 136–145)

## 2023-03-20 PROCEDURE — G0402 INITIAL PREVENTIVE EXAM: HCPCS | Performed by: PHYSICIAN ASSISTANT

## 2023-03-20 PROCEDURE — 83540 ASSAY OF IRON: CPT | Performed by: PHYSICIAN ASSISTANT

## 2023-03-20 PROCEDURE — 36415 COLL VENOUS BLD VENIPUNCTURE: CPT | Performed by: PHYSICIAN ASSISTANT

## 2023-03-20 PROCEDURE — 83550 IRON BINDING TEST: CPT | Performed by: PHYSICIAN ASSISTANT

## 2023-03-20 PROCEDURE — 99213 OFFICE O/P EST LOW 20 MIN: CPT | Mod: 25 | Performed by: PHYSICIAN ASSISTANT

## 2023-03-20 PROCEDURE — 80053 COMPREHEN METABOLIC PANEL: CPT | Performed by: PHYSICIAN ASSISTANT

## 2023-03-20 ASSESSMENT — ENCOUNTER SYMPTOMS
CONSTIPATION: 0
HEMATURIA: 0
DIZZINESS: 0
PARESTHESIAS: 0
SHORTNESS OF BREATH: 0
CHILLS: 0
PALPITATIONS: 0
NAUSEA: 0
COUGH: 0
FEVER: 0
WEAKNESS: 0
EYE PAIN: 0
DYSURIA: 0
JOINT SWELLING: 0
HEADACHES: 0
MYALGIAS: 0
DIARRHEA: 0
NERVOUS/ANXIOUS: 0
ABDOMINAL PAIN: 0
FREQUENCY: 0
HEARTBURN: 0
SORE THROAT: 0
HEMATOCHEZIA: 0
ARTHRALGIAS: 1

## 2023-03-20 ASSESSMENT — ACTIVITIES OF DAILY LIVING (ADL): CURRENT_FUNCTION: NO ASSISTANCE NEEDED

## 2023-03-20 NOTE — PROGRESS NOTES
"SUBJECTIVE:   Norris is a 63 year old who presents for Preventive Visit.    Patient has been advised of split billing requirements and indicates understanding: Yes  Are you in the first 12 months of your Medicare coverage?  Yes,  Visual Acuity:  Right Eye: 10/20   Left Eye: 10/25  Both Eyes: 10/12.5    Norris is a pleasant 63-year-old male that presents to the clinic today for a Medicare annual wellness visit.  Past medical history significant for hypertension, hyperlipidemia, Vargas's esophagus, B12 deficiency, iron deficiency, erectile dysfunction, and pancreatic cyst.    He is feeling well has no questions or concerns regarding his chronic health.  He does closely follow with gastroenterology as he has a history of Vargas's esophagus and a pancreatic cyst noted on an MRI of 8/2022.  He states he will be following up with gastroenterology for a recheck MRI in a few months.  He also has Vargas's esophagus.  Last EGD and biopsy was January 2022.  He is not having any issues with dysphagia.    He was having difficulty with paresthesia in the upper and lower extremities.  We did extensive labs including iron level, B12, TSH, CBC, CRP, sed rate, and a CK.  All of his labs were within normal range although his iron level was mildly elevated to 11.  He has been only taking iron every other day since this.  He states that since he was seen in February for the paresthesia his symptoms have improved although he continues to have some numbness and tingling in his right foot.  He does have chronic lower back pain and will be starting physical therapy to see if this helps.    Healthy Habits:     In general, how would you rate your overall health?  Good    Frequency of exercise:  2-3 days/week    Duration of exercise:  30-45 minutes    Do you usually eat at least 4 servings of fruit and vegetables a day, include whole grains    & fiber and avoid regularly eating high fat or \"junk\" foods?  No    Taking medications regularly:  " Yes    Medication side effects:  None    Ability to successfully perform activities of daily living:  No assistance needed    Home Safety:  No safety concerns identified    Hearing Impairment:  No hearing concerns    In the past 6 months, have you been bothered by leaking of urine?  No    In general, how would you rate your overall mental or emotional health?  Excellent      PHQ-2 Total Score: 0    Additional concerns today:  No      Have you ever done Advance Care Planning? (For example, a Health Directive, POLST, or a discussion with a medical provider or your loved ones about your wishes): Yes, patient states has an Advance Care Planning document and will bring a copy to the clinic.       Fall risk  Fallen 2 or more times in the past year?: No  Any fall with injury in the past year?: No    Cognitive Screening   1) Repeat 3 items (Leader, Season, Table)    2) Clock draw: NORMAL  3) 3 item recall: Recalls 3 objects  Results: NORMAL clock, 1-2 items recalled: COGNITIVE IMPAIRMENT LESS LIKELY    Mini-CogTM Copyright JUAN Bolden. Licensed by the author for use in [facility]; reprinted with permission (magalis@[facility]). All rights reserved.      Do you have sleep apnea, excessive snoring or daytime drowsiness?: yes    Reviewed and updated as needed this visit by clinical staff   Tobacco  Allergies  Meds              Reviewed and updated as needed this visit by Provider                 Social History     Tobacco Use     Smoking status: Former     Packs/day: 0.00     Years: 5.00     Pack years: 0.00     Types: Cigarettes     Passive exposure: Never     Smokeless tobacco: Former     Quit date: 3/25/1985   Substance Use Topics     Alcohol use: Yes     Alcohol/week: 12.0 standard drinks     Types: 12 Standard drinks or equivalent per week         Alcohol Use 3/17/2023   Prescreen: >3 drinks/day or >7 drinks/week? Yes   AUDIT SCORE  7     AUDIT - Alcohol Use Disorders Identification Test - Reproduced from the World  Health Organization Audit 2001 (Second Edition) 3/17/2023   1.  How often do you have a drink containing alcohol? 2 to 3 times a week   2.  How many drinks containing alcohol do you have on a typical day when you are drinking? 3 or 4   3.  How often do you have five or more drinks on one occasion? Less than monthly   4.  How often during the last year have you found that you were not able to stop drinking once you had started? Never   5.  How often during the last year have you failed to do what was normally expected of you because of drinking? Never   6.  How often during the last year have you needed a first drink in the morning to get yourself going after a heavy drinking session? Never   7.  How often during the last year have you had a feeling of guilt or remorse after drinking? Never   8.  How often during the last year have you been unable to remember what happened the night before because of your drinking? Never   9.  Have you or someone else been injured because of your drinking? No   10. Has a relative, friend, doctor or other health care worker been concerned about your drinking or suggested you cut down? Yes, but not in the last year   TOTAL SCORE 7       Current providers sharing in care for this patient include:   Patient Care Team:  Keanu Álvarez PA-C as PCP - General (Family Medicine)  Keanu Álvarez PA-C as Assigned PCP  Vic Jain DO as MD (Neurology)    The following health maintenance items are reviewed in Epic and correct as of today:  Health Maintenance   Topic Date Due     ANNUAL REVIEW OF HM ORDERS  10/06/2022     Pneumococcal Vaccine: 65+ Years (1 - PCV) 01/31/2023     MEDICARE ANNUAL WELLNESS VISIT  03/20/2024     FALL RISK ASSESSMENT  03/20/2024     COLORECTAL CANCER SCREENING  04/28/2024     LIPID  09/20/2027     ADVANCE CARE PLANNING  03/20/2028     DTAP/TDAP/TD IMMUNIZATION (2 - Td or Tdap) 05/20/2029     HEPATITIS C SCREENING  Completed     HIV SCREENING   "Completed     PHQ-2 (once per calendar year)  Completed     INFLUENZA VACCINE  Completed     ZOSTER IMMUNIZATION  Completed     AORTIC ANEURYSM SCREENING (SYSTEM ASSIGNED)  Completed     COVID-19 Vaccine  Completed     IPV IMMUNIZATION  Aged Out     MENINGITIS IMMUNIZATION  Aged Out     LUNG CANCER SCREENING  Discontinued               Review of Systems   Constitutional: Negative for chills and fever.   HENT: Negative for congestion, ear pain, hearing loss and sore throat.    Eyes: Negative for pain and visual disturbance.   Respiratory: Negative for cough and shortness of breath.    Cardiovascular: Negative for chest pain, palpitations and peripheral edema.   Gastrointestinal: Negative for abdominal pain, constipation, diarrhea, heartburn, hematochezia and nausea.   Genitourinary: Positive for impotence. Negative for dysuria, frequency, genital sores, hematuria, penile discharge and urgency.   Musculoskeletal: Positive for arthralgias. Negative for joint swelling and myalgias.   Skin: Negative for rash.   Neurological: Negative for dizziness, weakness, headaches and paresthesias.   Psychiatric/Behavioral: Negative for mood changes. The patient is not nervous/anxious.          OBJECTIVE:   /80   Pulse 80   Temp 97.4  F (36.3  C) (Oral)   Resp 20   Ht 1.727 m (5' 8\")   Wt 104.3 kg (230 lb)   SpO2 97%   BMI 34.97 kg/m   Estimated body mass index is 34.97 kg/m  as calculated from the following:    Height as of this encounter: 1.727 m (5' 8\").    Weight as of this encounter: 104.3 kg (230 lb).  Physical Exam  Vitals and nursing note reviewed.   Constitutional:       General: He is not in acute distress.     Appearance: Normal appearance. He is well-developed and well-groomed. He is not ill-appearing or toxic-appearing.   HENT:      Head: Normocephalic and atraumatic.      Right Ear: Tympanic membrane, ear canal and external ear normal.      Left Ear: Tympanic membrane, ear canal and external ear normal.     "  Mouth/Throat:      Lips: Pink.      Mouth: Mucous membranes are moist.      Palate: No mass.      Pharynx: Oropharynx is clear. Uvula midline.      Tonsils: No tonsillar exudate or tonsillar abscesses.   Eyes:      General: Lids are normal.         Right eye: No discharge.         Left eye: No discharge.   Neck:      Trachea: Trachea normal.   Cardiovascular:      Rate and Rhythm: Normal rate and regular rhythm.      Heart sounds: S1 normal and S2 normal. No murmur heard.  Pulmonary:      Effort: Pulmonary effort is normal.      Breath sounds: Normal breath sounds and air entry.   Abdominal:      General: Bowel sounds are normal. There is no distension.      Palpations: Abdomen is soft.      Tenderness: There is no abdominal tenderness. There is no guarding or rebound.   Musculoskeletal:      Cervical back: Full passive range of motion without pain and neck supple.      Right lower leg: No edema.      Left lower leg: No edema.   Lymphadenopathy:      Cervical: No cervical adenopathy.   Skin:     General: Skin is warm and dry.      Findings: No lesion or rash.   Neurological:      General: No focal deficit present.      Mental Status: He is alert.      Cranial Nerves: No cranial nerve deficit.      Gait: Gait is intact.      Deep Tendon Reflexes:      Reflex Scores:       Patellar reflexes are 2+ on the right side and 2+ on the left side.  Psychiatric:         Attention and Perception: Attention normal.         Mood and Affect: Mood normal.         Speech: Speech normal.           ASSESSMENT / PLAN:       ICD-10-CM    1. Medicare annual wellness visit, initial  Z00.00 Comprehensive metabolic panel (BMP + Alb, Alk Phos, ALT, AST, Total. Bili, TP)     Comprehensive metabolic panel (BMP + Alb, Alk Phos, ALT, AST, Total. Bili, TP)      2. Essential hypertension  I10       3. Essential Hypercholesterolemia  E78.00       4. Iron deficiency anemia, of uncertain etiology  D50.9       5. Vargas's esophagus with esophagitis   K22.70     K20.90       6. History of colon cancer  Z85.038       7. Male Erectile Disorder Due To Physical Condition  N52.9       8. Iron deficiency anemia due to chronic blood loss  D50.0 Iron and iron binding capacity     Iron and iron binding capacity      9. Vitamin B12 deficiency (non anemic)  E53.8       10. Restless leg syndrome  G25.81       11. Paresthesia  R20.2       12. Screening PSA (prostate specific antigen)  Z12.5       13. Cyst of pancreas  K86.2         #1 annual Medicare wellness visit initial  We will update screening labs including a complete metabolic panel and iron.  He did have labs on 2/23 which were stable.    #2 hypertension  Blood pressure is well controlled at 130/80.  Continue antihypertension medications at this time    #3 hyperlipidemia  Continue with simvastatin 40 mg daily.  No side effects of the medication.  Last lipid panel 9/2022 was stable with an LDL of 89, triglyceride of 60, and HDL of 101.  Diet and exercise discussed    #4 Vargas's esophagus  No issues with dysphagia.  Last biopsy 1/2022 was negative.  He is planning to undergo a EGD and colonoscopy 2024 with gastroenterology continue with omeprazole    #5 history of colon cancer  Status post right colectomy in 2001.  Follow closely with gastroenterology next colonoscopy due 4/2024 no changes in bowel habits at this time.    #6 B12 deficiency  Last B12 2/2022 was stable at 502.  Last CBC on 2/2023 was stable at 11.8 continue with injections once a month    #7 iron deficiency anemia  We did check his iron level 2/2022 iron level was elevated at 211 so he decreased to every other day.  We will check iron levels today.    #8 erectile dysfunction  Using Viagra as needed.  No side effects of the medication    #9 pancreatic cyst  Noted on MRI of 8/20/2022 at the tail of the pancreas measuring 1.4 cm.  He is following with gastroenterology regarding this    #10 restless leg  Symptoms come and go.  Stable at this time    # 11  "paresthesia   Thought to be due to B12 deficiency or anemia.  Work-up was unremarkable.  Symptoms have improved so he would like to continue to monitor this.  Neurology referral if symptoms continue or worsen    #12 colon cancer screening  Up-to-date on colonoscopy.  Next colonoscopy due 4/2024    #13 prostate cancer screen  He did have a PSA 9/2022 stable at 0.54.    #14 chronic lower back pain  He is having some radiculopathy down his left leg and into his left foot.  No bowel or bladder dysfunction.  He is seeing orthopedics for this.  He is going to be starting physical therapy on April 17.      Patient has been advised of split billing requirements and indicates understanding: Yes      COUNSELING:  Reviewed preventive health counseling, as reflected in patient instructions       Regular exercise       Healthy diet/nutrition       Vision screening       Colon cancer screening       Prostate cancer screening      BMI:   Estimated body mass index is 34.97 kg/m  as calculated from the following:    Height as of this encounter: 1.727 m (5' 8\").    Weight as of this encounter: 104.3 kg (230 lb).   Weight management plan: Discussed healthy diet and exercise guidelines      He reports that he has quit smoking. His smoking use included cigarettes. He has never been exposed to tobacco smoke. He quit smokeless tobacco use about 38 years ago.      Appropriate preventive services were discussed with this patient, including applicable screening as appropriate for cardiovascular disease, diabetes, osteopenia/osteoporosis, and glaucoma.  As appropriate for age/gender, discussed screening for colorectal cancer, prostate cancer, breast cancer, and cervical cancer. Checklist reviewing preventive services available has been given to the patient.    Reviewed patients plan of care and provided an AVS. The Basic Care Plan (routine screening as documented in Health Maintenance) for Norris meets the Care Plan requirement. This Care Plan " has been established and reviewed with the Patient.          Keanu lÁvarez PA-C  [facility]    Identified Health Risks:

## 2023-03-22 ENCOUNTER — TRANSFERRED RECORDS (OUTPATIENT)
Dept: HEALTH INFORMATION MANAGEMENT | Facility: CLINIC | Age: 63
End: 2023-03-22

## 2023-04-17 ENCOUNTER — OFFICE VISIT (OUTPATIENT)
Dept: NEUROLOGY | Facility: CLINIC | Age: 63
End: 2023-04-17
Attending: PHYSICIAN ASSISTANT
Payer: MEDICARE

## 2023-04-17 ENCOUNTER — LAB (OUTPATIENT)
Dept: LAB | Facility: CLINIC | Age: 63
End: 2023-04-17
Payer: MEDICARE

## 2023-04-17 VITALS — DIASTOLIC BLOOD PRESSURE: 88 MMHG | HEART RATE: 74 BPM | SYSTOLIC BLOOD PRESSURE: 144 MMHG

## 2023-04-17 DIAGNOSIS — R20.2 PARESTHESIA: ICD-10-CM

## 2023-04-17 DIAGNOSIS — R79.9 ABNORMAL FINDING OF BLOOD CHEMISTRY, UNSPECIFIED: ICD-10-CM

## 2023-04-17 LAB
FERRITIN SERPL-MCNC: 21 NG/ML (ref 31–409)
FOLATE SERPL-MCNC: 21.5 NG/ML (ref 4.6–34.8)
IRON BINDING CAPACITY (ROCHE): 433 UG/DL (ref 240–430)
IRON SATN MFR SERPL: 8 % (ref 15–46)
IRON SERPL-MCNC: 34 UG/DL (ref 61–157)
TOTAL PROTEIN SERUM FOR ELP: 7.1 G/DL (ref 6.4–8.3)
VIT B12 SERPL-MCNC: 507 PG/ML (ref 232–1245)

## 2023-04-17 PROCEDURE — 82607 VITAMIN B-12: CPT

## 2023-04-17 PROCEDURE — 83550 IRON BINDING TEST: CPT

## 2023-04-17 PROCEDURE — 36415 COLL VENOUS BLD VENIPUNCTURE: CPT

## 2023-04-17 PROCEDURE — 83921 ORGANIC ACID SINGLE QUANT: CPT

## 2023-04-17 PROCEDURE — 82728 ASSAY OF FERRITIN: CPT

## 2023-04-17 PROCEDURE — 84165 PROTEIN E-PHORESIS SERUM: CPT | Mod: TC | Performed by: PATHOLOGY

## 2023-04-17 PROCEDURE — 99204 OFFICE O/P NEW MOD 45 MIN: CPT | Performed by: PSYCHIATRY & NEUROLOGY

## 2023-04-17 PROCEDURE — 83090 ASSAY OF HOMOCYSTEINE: CPT

## 2023-04-17 PROCEDURE — 82746 ASSAY OF FOLIC ACID SERUM: CPT

## 2023-04-17 PROCEDURE — 84155 ASSAY OF PROTEIN SERUM: CPT

## 2023-04-17 PROCEDURE — 86334 IMMUNOFIX E-PHORESIS SERUM: CPT | Performed by: PATHOLOGY

## 2023-04-17 NOTE — PATIENT INSTRUCTIONS
I think your foot on the right doesn't have certain deficits I would often need to see to obtain other advanced testing (EMG, Lumbar spine MRI).  For your lower back ache, I would agree with attending with your primary care provider, and chiropractor for ache control.      The whole body sensory issue seems odd, but your exam being normal is reassuring. I would want you to obtain some tests to look for possible causes of a transient paresthesia as you describe.  - Iron/iron binding, Ferritin, folate, b12, MMA, Homocysteine, SPEP, Immunofixation

## 2023-04-17 NOTE — NURSING NOTE
Chief Complaint   Patient presents with     Numbness     Right foot, noticed the numbness comes and goes  Referred by Dr. Álvarez     Back Pain       BRANDON Eden on 4/17/2023 at 7:01 AM

## 2023-04-17 NOTE — LETTER
"    4/17/2023         RE: Norris Jolly  [address] [place] [address]        Dear Colleague,    Thank you for referring your patient, Norris Jolly, to the [facility]. Please see a copy of my visit note below.    [facility] Neurology Consultation    Norris Jolly MRN# [id number]   Age: 63 year old YOB: 1960     Requesting physician: Keanu Purcell     Reason for Consultation: paresthesias      History of Presenting Symptoms:   Norris Jolly is a 63 year old male who presents today for evaluation of paresthesias.  The patient has a pertinent medical history of B12 deficiency, Alcohol abuse, HTN, colon cancer (s/p chemotherapy in 2001 and s/p colon resection), and is s/p cholecystectomy.    He had a visit with his PCP 2/17/2023 reporting 2-3 weeks of paresthesias to his hands, arms, leg, and feet.  Symptoms seemed to  Last 70 minutes, would worsen through the day, and was not associated with weakness.  He has longer-standing numbness and tingling in his right foot.  After serum testing, he was found to have a mildly elevated iron level.    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  He always has a heating pad on his lower back when at home in the winter. Today, the patient confirms that around last year he noticed that he had right foot tingling in his toes on the right side.  Over the last year the symptom have become frequent but has stayed in the same region.  There is no burning, stabbing or shooting pain.  There is no notable weakness in his right foot. There is no specific body position that worsens the foot paresthesias, and no shooting pain from the back into the foot (or [id number]).    Around 1/2023 he started noticing a feeling of tingling in his hands, forearms, torso, legs, feet.  The tingling was not linked with aches, or weakness.  He felt \"not normal\" " overall in his body.   There was no changes with vision, dysphagia, or issues of the face or neck.  He had no signs of infection at onset or during.  He may notice the symptoms when relaxed in his chair but not necessarily while active in the day.  There was no specific duration of symptom, as they would syeda when distracted again. These symptoms went away around 3/2023.     Social History:   24 pk per week.  No smoking history.      Medications:   ASA  Ferrous gluconate  hydrochlorothiazide  Lisinopril  Multivitamin  Simvastatin     Physical Exam:   Vitals: BP (!) 144/88 (BP Location: Right arm, Patient Position: Sitting)   Pulse 74    General: Seated comfortably in no acute distress.  HEENT: Neck supple with normal range of motion. No paracervical muscle tenderness or tightness.    Neurologic:     Mental Status: Fully alert, attentive and oriented. Speech clear and fluent, no paraphasic errors.      Cranial Nerves: Visual fields intact. PERRL. EOMI with normal smooth pursuit. Facial sensation intact/symmetric. Facial movements symmetric. Hearing not formally tested but intact to conversation. Palate elevation symmetric, uvula midline. No dysarthria. Shoulder shrug strong bilaterally. Tongue protrusion midline.     Motor: No tremors or other abnormal movements observed. Muscle tone normal throughout. No pronator drift. Normal/symmetric rapid finger tapping. Strength 5/5 throughout upper and lower extremities.     Deep Tendon Reflexes: 2+/symmetric throughout upper and lower extremities. No clonus. Toes downgoing bilaterally.     Sensory: Intact/symmetric to light touch, pinprick, temperature, vibration and proprioception throughout upper and lower extremities. Negative Romberg.      Coordination: Finger-nose-finger and heel-shin intact without dysmetria. Rapid alternating movements intact/symmetric with normal speed and rhythm.     Gait: Normal, steady casual gait. Able to walk on toes, heels and tandem without  difficulty.         Data: Pertinent prior to visit   Laboratory:  2/17/2023:  - Iron binding capacity 456 (high), Iron 155, iron saturation 34, ferritin 19 (low)  - CMP, CK, CRP, TSH, B12, CBC, ESR ~ normal         Assessment and Plan:   Assessment:  Full body paresthesias  Right foot paresthesia    The patient's exam today is normal, but his symptoms of the right side are concerning for a possible nerve compression either at L5 on the right, or another localized compression w/in the leg.  His whole body sensory issues are difficult to pin to a neuropathy given the diffuse nature, transient symptoms with attention, and lack of any type of progression or associated symptoms.  To me this diffuse body issue sounds more akin to a restlessness or inner sensation than a peripheral process.  Given his alcohol history, there could be the start of a distal symmetric neuropathy in his right foot, but this is merely conjecture at best given the lack of findings on exam and lack of progression.  Given his chemotherapy history, a similar thought to alcohol can be made but not easily defined.  Without notable impact on his daily life in regards to right foot sensory issues, and full body paresthesias, I am not inclined to obtain EMG or Lumbar MRI at this time but instead would ask the patient to obtain some labs to look for common reversible causes of neuropathy and inform me if his symptoms change in some way in the future.       Plan:  - Iron/iron binding, Ferritin, folate, b12, MMA, Homocysteine, SPEP, Immunofixation    Follow up in Neurology clinic in one year, or should new concerns arise.    DANISHA Jain D.O.   of Neurology    Total time today (55 min) in this patient encounter was spent on pre-charting, counseling and/or coordination of care. The patient is in agreement with this plan and has no further questions.        Again, thank you for allowing me to participate in the care of your patient.         Sincerely,        Vic Jain, DO

## 2023-04-17 NOTE — PROGRESS NOTES
"[facility] Neurology Consultation    Norris Jolly MRN# [id number]   Age: 63 year old YOB: 1960     Requesting physician: Keanu Purcell     Reason for Consultation: paresthesias      History of Presenting Symptoms:   Norris Jolly is a 63 year old male who presents today for evaluation of paresthesias.  The patient has a pertinent medical history of B12 deficiency, Alcohol abuse, HTN, colon cancer (s/p chemotherapy in 2001 and s/p colon resection), and is s/p cholecystectomy.    He had a visit with his PCP 2/17/2023 reporting 2-3 weeks of paresthesias to his hands, arms, leg, and feet.  Symptoms seemed to  Last 70 minutes, would worsen through the day, and was not associated with weakness.  He has longer-standing numbness and tingling in his right foot.  After serum testing, he was found to have a mildly elevated iron level.    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  He always has a heating pad on his lower back when at home in the winter. Today, the patient confirms that around last year he noticed that he had right foot tingling in his toes on the right side.  Over the last year the symptom have become frequent but has stayed in the same region.  There is no burning, stabbing or shooting pain.  There is no notable weakness in his right foot. There is no specific body position that worsens the foot paresthesias, and no shooting pain from the back into the foot (or [id number]).    Around 1/2023 he started noticing a feeling of tingling in his hands, forearms, torso, legs, feet.  The tingling was not linked with aches, or weakness.  He felt \"not normal\" overall in his body.   There was no changes with vision, dysphagia, or issues of the face or neck.  He had no signs of infection at onset or during.  He may notice the symptoms when relaxed in his chair but not necessarily while active in the day.  There was no specific " duration of symptom, as they would syeda when distracted again. These symptoms went away around 3/2023.     Social History:   24 pk per week.  No smoking history.      Medications:   ASA  Ferrous gluconate  hydrochlorothiazide  Lisinopril  Multivitamin  Simvastatin     Physical Exam:   Vitals: BP (!) 144/88 (BP Location: Right arm, Patient Position: Sitting)   Pulse 74    General: Seated comfortably in no acute distress.  HEENT: Neck supple with normal range of motion. No paracervical muscle tenderness or tightness.    Neurologic:     Mental Status: Fully alert, attentive and oriented. Speech clear and fluent, no paraphasic errors.      Cranial Nerves: Visual fields intact. PERRL. EOMI with normal smooth pursuit. Facial sensation intact/symmetric. Facial movements symmetric. Hearing not formally tested but intact to conversation. Palate elevation symmetric, uvula midline. No dysarthria. Shoulder shrug strong bilaterally. Tongue protrusion midline.     Motor: No tremors or other abnormal movements observed. Muscle tone normal throughout. No pronator drift. Normal/symmetric rapid finger tapping. Strength 5/5 throughout upper and lower extremities.     Deep Tendon Reflexes: 2+/symmetric throughout upper and lower extremities. No clonus. Toes downgoing bilaterally.     Sensory: Intact/symmetric to light touch, pinprick, temperature, vibration and proprioception throughout upper and lower extremities. Negative Romberg.      Coordination: Finger-nose-finger and heel-shin intact without dysmetria. Rapid alternating movements intact/symmetric with normal speed and rhythm.     Gait: Normal, steady casual gait. Able to walk on toes, heels and tandem without difficulty.         Data: Pertinent prior to visit   Laboratory:  2/17/2023:  - Iron binding capacity 456 (high), Iron 155, iron saturation 34, ferritin 19 (low)  - CMP, CK, CRP, TSH, B12, CBC, ESR ~ normal         Assessment and Plan:   Assessment:  Full body  paresthesias  Right foot paresthesia    The patient's exam today is normal, but his symptoms of the right side are concerning for a possible nerve compression either at L5 on the right, or another localized compression w/in the leg.  His whole body sensory issues are difficult to pin to a neuropathy given the diffuse nature, transient symptoms with attention, and lack of any type of progression or associated symptoms.  To me this diffuse body issue sounds more akin to a restlessness or inner sensation than a peripheral process.  Given his alcohol history, there could be the start of a distal symmetric neuropathy in his right foot, but this is merely conjecture at best given the lack of findings on exam and lack of progression.  Given his chemotherapy history, a similar thought to alcohol can be made but not easily defined.  Without notable impact on his daily life in regards to right foot sensory issues, and full body paresthesias, I am not inclined to obtain EMG or Lumbar MRI at this time but instead would ask the patient to obtain some labs to look for common reversible causes of neuropathy and inform me if his symptoms change in some way in the future.       Plan:  - Iron/iron binding, Ferritin, folate, b12, MMA, Homocysteine, SPEP, Immunofixation    Follow up in Neurology clinic in one year, or should new concerns arise.    DANISHA Jain D.O.   of Neurology    Total time today (55 min) in this patient encounter was spent on pre-charting, counseling and/or coordination of care. The patient is in agreement with this plan and has no further questions.

## 2023-04-19 ENCOUNTER — MYC MEDICAL ADVICE (OUTPATIENT)
Dept: FAMILY MEDICINE | Facility: CLINIC | Age: 63
End: 2023-04-19
Payer: MEDICARE

## 2023-04-19 DIAGNOSIS — D50.9 IRON DEFICIENCY ANEMIA, UNSPECIFIED IRON DEFICIENCY ANEMIA TYPE: Primary | ICD-10-CM

## 2023-04-19 LAB
ALBUMIN SERPL ELPH-MCNC: 4.3 G/DL (ref 3.7–5.1)
ALPHA1 GLOB SERPL ELPH-MCNC: 0.3 G/DL (ref 0.2–0.4)
ALPHA2 GLOB SERPL ELPH-MCNC: 0.7 G/DL (ref 0.5–0.9)
B-GLOBULIN SERPL ELPH-MCNC: 0.9 G/DL (ref 0.6–1)
GAMMA GLOB SERPL ELPH-MCNC: 0.9 G/DL (ref 0.7–1.6)
HCYS SERPL-SCNC: 6.2 UMOL/L (ref 4–12)
M PROTEIN SERPL ELPH-MCNC: 0 G/DL
PROT PATTERN SERPL ELPH-IMP: NORMAL
PROT PATTERN SERPL IFE-IMP: NORMAL

## 2023-04-19 PROCEDURE — 86334 IMMUNOFIX E-PHORESIS SERUM: CPT | Mod: 26

## 2023-04-19 PROCEDURE — 84165 PROTEIN E-PHORESIS SERUM: CPT | Mod: 26

## 2023-04-20 LAB — METHYLMALONATE SERPL-SCNC: 0.13 UMOL/L (ref 0–0.4)

## 2023-04-25 ENCOUNTER — TRANSFERRED RECORDS (OUTPATIENT)
Dept: HEALTH INFORMATION MANAGEMENT | Facility: CLINIC | Age: 63
End: 2023-04-25
Payer: MEDICARE

## 2023-05-02 ENCOUNTER — TRANSFERRED RECORDS (OUTPATIENT)
Dept: HEALTH INFORMATION MANAGEMENT | Facility: CLINIC | Age: 63
End: 2023-05-02
Payer: MEDICARE

## 2023-05-18 ENCOUNTER — LAB (OUTPATIENT)
Dept: LAB | Facility: CLINIC | Age: 63
End: 2023-05-18
Payer: MEDICARE

## 2023-05-18 DIAGNOSIS — D50.9 IRON DEFICIENCY ANEMIA, UNSPECIFIED IRON DEFICIENCY ANEMIA TYPE: ICD-10-CM

## 2023-05-18 LAB
FERRITIN SERPL-MCNC: 34 NG/ML (ref 31–409)
IRON BINDING CAPACITY (ROCHE): 383 UG/DL (ref 240–430)
IRON SATN MFR SERPL: 23 % (ref 15–46)
IRON SERPL-MCNC: 87 UG/DL (ref 61–157)

## 2023-05-18 PROCEDURE — 83540 ASSAY OF IRON: CPT

## 2023-05-18 PROCEDURE — 82728 ASSAY OF FERRITIN: CPT

## 2023-05-18 PROCEDURE — 83550 IRON BINDING TEST: CPT

## 2023-05-18 PROCEDURE — 36415 COLL VENOUS BLD VENIPUNCTURE: CPT

## 2023-06-06 ENCOUNTER — TRANSFERRED RECORDS (OUTPATIENT)
Dept: HEALTH INFORMATION MANAGEMENT | Facility: CLINIC | Age: 63
End: 2023-06-06
Payer: MEDICARE

## 2023-08-02 ENCOUNTER — HOSPITAL ENCOUNTER (OUTPATIENT)
Dept: MRI IMAGING | Facility: CLINIC | Age: 63
Discharge: HOME | End: 2023-08-02
Attending: INTERNAL MEDICINE | Admitting: INTERNAL MEDICINE
Payer: MEDICARE

## 2023-08-02 DIAGNOSIS — K86.2 PANCREATIC CYST: ICD-10-CM

## 2023-08-02 PROCEDURE — A9585 GADOBUTROL INJECTION: HCPCS | Mod: JZ | Performed by: INTERNAL MEDICINE

## 2023-08-02 PROCEDURE — 74183 MRI ABD W/O CNTR FLWD CNTR: CPT

## 2023-08-02 PROCEDURE — 255N000002 HC RX 255 OP 636: Mod: JZ | Performed by: INTERNAL MEDICINE

## 2023-08-02 RX ORDER — GADOBUTROL 604.72 MG/ML
10 INJECTION INTRAVENOUS ONCE
Status: COMPLETED | OUTPATIENT
Start: 2023-08-02 | End: 2023-08-02

## 2023-08-02 RX ADMIN — GADOBUTROL 10 ML: 604.72 INJECTION INTRAVENOUS at 07:03

## 2023-08-04 ENCOUNTER — TRANSFERRED RECORDS (OUTPATIENT)
Dept: HEALTH INFORMATION MANAGEMENT | Facility: CLINIC | Age: 63
End: 2023-08-04
Payer: MEDICARE

## 2023-08-27 DIAGNOSIS — E78.00 PURE HYPERCHOLESTEROLEMIA: ICD-10-CM

## 2023-08-27 RX ORDER — SIMVASTATIN 40 MG
TABLET ORAL
Qty: 90 TABLET | Refills: 1 | Status: SHIPPED | OUTPATIENT
Start: 2023-08-27 | End: 2024-02-16

## 2023-08-27 NOTE — TELEPHONE ENCOUNTER
"Routing refill request to provider for review/approval because:  Requesting refill too soon for [facility] to fill    Last Written Prescription Date:  9/20/22  Last Fill Quantity: 3,  # refills: 90   Last office visit provider:  3/20/23     Requested Prescriptions   Pending Prescriptions Disp Refills    simvastatin (ZOCOR) 40 MG tablet [Pharmacy Med Name: SIMVASTATIN 40 MG TABLET] 90 tablet 3     Sig: TAKE 1 TABLET BY MOUTH EVERYDAY AT BEDTIME       Statins Protocol Passed - 8/27/2023  1:38 AM        Passed - LDL on file in past 12 months     Recent Labs   Lab Test 09/20/22  0924   LDL 89             Passed - No abnormal creatine kinase in past 12 months     Recent Labs   Lab Test 02/17/23  0926                   Passed - Recent (12 mo) or future (30 days) visit within the authorizing provider's specialty     Patient has had an office visit with the authorizing provider or a provider within the authorizing providers department within the previous 12 mos or has a future within next 30 days. See \"Patient Info\" tab in inbasket, or \"Choose Columns\" in Meds & Orders section of the refill encounter.              Passed - Medication is active on med list        Passed - Patient is age 18 or older             Dorie Cortez RN 08/27/23 1:40 AM  "

## 2023-09-18 DIAGNOSIS — G25.81 RESTLESS LEG SYNDROME: ICD-10-CM

## 2023-09-18 RX ORDER — FERROUS GLUCONATE 324(38)MG
324 TABLET ORAL
Qty: 90 TABLET | Refills: 0 | Status: SHIPPED | OUTPATIENT
Start: 2023-09-18 | End: 2023-12-15

## 2023-09-28 DIAGNOSIS — E53.9 VITAMIN B-COMPLEX DEFICIENCY: ICD-10-CM

## 2023-09-28 RX ORDER — CYANOCOBALAMIN 1000 UG/ML
1000 INJECTION, SOLUTION INTRAMUSCULAR; SUBCUTANEOUS
Qty: 3 ML | Refills: 1 | Status: SHIPPED | OUTPATIENT
Start: 2023-09-28 | End: 2024-03-20

## 2023-10-28 DIAGNOSIS — K22.70 BARRETT'S ESOPHAGUS WITH ESOPHAGITIS: ICD-10-CM

## 2023-10-28 DIAGNOSIS — K20.90 BARRETT'S ESOPHAGUS WITH ESOPHAGITIS: ICD-10-CM

## 2023-10-30 RX ORDER — OMEPRAZOLE 40 MG/1
40 CAPSULE, DELAYED RELEASE ORAL DAILY
Qty: 90 CAPSULE | Refills: 0 | Status: SHIPPED | OUTPATIENT
Start: 2023-10-30 | End: 2024-01-29

## 2023-11-21 DIAGNOSIS — I10 ESSENTIAL HYPERTENSION: ICD-10-CM

## 2023-11-21 RX ORDER — HYDROCHLOROTHIAZIDE 12.5 MG/1
CAPSULE ORAL
Qty: 90 CAPSULE | Refills: 1 | Status: SHIPPED | OUTPATIENT
Start: 2023-11-21 | End: 2024-03-20

## 2023-11-21 RX ORDER — LISINOPRIL 10 MG/1
10 TABLET ORAL DAILY
Qty: 90 TABLET | Refills: 1 | Status: SHIPPED | OUTPATIENT
Start: 2023-11-21 | End: 2024-03-20

## 2023-11-23 DIAGNOSIS — E53.9 VITAMIN B-COMPLEX DEFICIENCY: ICD-10-CM

## 2023-11-27 RX ORDER — CYANOCOBALAMIN 1000 UG/ML
1000 INJECTION, SOLUTION INTRAMUSCULAR; SUBCUTANEOUS
Qty: 9 ML | Refills: 1 | OUTPATIENT
Start: 2023-11-27

## 2023-11-28 DIAGNOSIS — E53.9 VITAMIN B-COMPLEX DEFICIENCY: ICD-10-CM

## 2023-11-28 RX ORDER — CYANOCOBALAMIN 1000 UG/ML
1000 INJECTION, SOLUTION INTRAMUSCULAR; SUBCUTANEOUS
Qty: 3 ML | Refills: 1 | OUTPATIENT
Start: 2023-11-28

## 2023-12-15 ENCOUNTER — PATIENT OUTREACH (OUTPATIENT)
Dept: GASTROENTEROLOGY | Facility: CLINIC | Age: 63
End: 2023-12-15
Payer: MEDICARE

## 2023-12-15 DIAGNOSIS — G25.81 RESTLESS LEG SYNDROME: ICD-10-CM

## 2023-12-15 RX ORDER — FERROUS GLUCONATE 324(38)MG
324 TABLET ORAL
Qty: 90 TABLET | Refills: 0 | Status: SHIPPED | OUTPATIENT
Start: 2023-12-15 | End: 2024-03-20

## 2024-01-16 ENCOUNTER — TRANSFERRED RECORDS (OUTPATIENT)
Dept: HEALTH INFORMATION MANAGEMENT | Facility: CLINIC | Age: 64
End: 2024-01-16
Payer: MEDICARE

## 2024-01-24 ENCOUNTER — ALLIED HEALTH/NURSE VISIT (OUTPATIENT)
Dept: FAMILY MEDICINE | Facility: CLINIC | Age: 64
End: 2024-01-24
Payer: MEDICARE

## 2024-01-24 DIAGNOSIS — Z29.11 NEED FOR VACCINATION AGAINST RESPIRATORY SYNCYTIAL VIRUS: ICD-10-CM

## 2024-01-24 DIAGNOSIS — Z23 ENCOUNTER FOR IMMUNIZATION: Primary | ICD-10-CM

## 2024-01-24 PROCEDURE — 91320 SARSCV2 VAC 30MCG TRS-SUC IM: CPT

## 2024-01-24 PROCEDURE — 99207 PR NO CHARGE NURSE ONLY: CPT

## 2024-01-24 PROCEDURE — 90662 IIV NO PRSV INCREASED AG IM: CPT

## 2024-01-24 PROCEDURE — G0008 ADMIN INFLUENZA VIRUS VAC: HCPCS

## 2024-01-24 PROCEDURE — 90480 ADMN SARSCOV2 VAC 1/ONLY CMP: CPT

## 2024-01-24 NOTE — PROGRESS NOTES
Prior to immunization administration, verified patients identity using patient s name and date of birth. Please see Immunization Activity for additional information.     Screening Questionnaire for Adult Immunization    Are you sick today?   No   Do you have allergies to medications, food, a vaccine component or latex?   No   Have you ever had a serious reaction after receiving a vaccination?   No   Do you have a long-term health problem with heart, lung, kidney, or metabolic disease (e.g., diabetes), asthma, a blood disorder, no spleen, complement component deficiency, a cochlear implant, or a spinal fluid leak?  Are you on long-term aspirin therapy?   No   Do you have cancer, leukemia, HIV/AIDS, or any other immune system problem?   No   Do you have a parent, brother, or sister with an immune system problem?   No   In the past 3 months, have you taken medications that affect  your immune system, such as prednisone, other steroids, or anticancer drugs; drugs for the treatment of rheumatoid arthritis, Crohn s disease, or psoriasis; or have you had radiation treatments?   No   Have you had a seizure, or a brain or other nervous system problem?   No   During the past year, have you received a transfusion of blood or blood    products, or been given immune (gamma) globulin or antiviral drug?   No   For women: Are you pregnant or is there a chance you could become       pregnant during the next month?   No   Have you received any vaccinations in the past 4 weeks?   No     Immunization questionnaire answers were all negative.    I have reviewed the following standing orders:   This patient is due and qualifies for the Covid-19 vaccine.     Click here for COVID-19 Standing Order    I have reviewed the vaccines inclusion and exclusion criteria; No concerns regarding eligibility.     This patient is due and qualifies for the Influenza vaccine.    Click here for Influenza Vaccine Standing Order    I have reviewed the  vaccines inclusion and exclusion criteria; No concerns regarding eligibility.     Patient instructed to remain in clinic for 15 minutes afterwards, and to report any adverse reactions.     Screening performed by Kia Em MA on 1/24/2024 at 8:43 AM.

## 2024-01-27 DIAGNOSIS — K20.90 BARRETT'S ESOPHAGUS WITH ESOPHAGITIS: ICD-10-CM

## 2024-01-27 DIAGNOSIS — K22.70 BARRETT'S ESOPHAGUS WITH ESOPHAGITIS: ICD-10-CM

## 2024-01-28 DIAGNOSIS — G25.81 RESTLESS LEG SYNDROME: ICD-10-CM

## 2024-01-29 ENCOUNTER — PATIENT OUTREACH (OUTPATIENT)
Dept: GASTROENTEROLOGY | Facility: CLINIC | Age: 64
End: 2024-01-29
Payer: MEDICARE

## 2024-01-29 DIAGNOSIS — Z12.11 SPECIAL SCREENING FOR MALIGNANT NEOPLASMS, COLON: Primary | ICD-10-CM

## 2024-01-29 RX ORDER — OMEPRAZOLE 40 MG/1
40 CAPSULE, DELAYED RELEASE ORAL DAILY
Qty: 30 CAPSULE | Refills: 1 | Status: SHIPPED | OUTPATIENT
Start: 2024-01-29 | End: 2024-03-18

## 2024-01-29 RX ORDER — FERROUS GLUCONATE 324(38)MG
324 TABLET ORAL
Qty: 90 TABLET | Refills: 0 | OUTPATIENT
Start: 2024-01-29

## 2024-01-29 NOTE — PROGRESS NOTES
Order cancelled, per pt Peter he is scheduled 4/14/2024    Elvira Castro RN, BSN  Colorectal Cancer   Division of Gastroenterology at [facility]

## 2024-01-29 NOTE — PROGRESS NOTES
"Hx adenomatous polyps. Recalled in 3 years from 2021 colonoscopy. Due April 2024  [facility] Screening Colonoscopy Referral Review    Patient meets the inclusion criteria for screening colonoscopy standing order.    Ordering/Referring Provider:  Keanu Álvarez    BMI: Estimated body mass index is 34.97 kg/m  as calculated from the following:    Height as of 3/20/23: 1.727 m (5' 8\").    Weight as of 3/20/23: 104.3 kg (230 lb).     Sedation:  Does patient have any of the following conditions affecting sedation?  No medical conditions affecting sedation.    Previous Scopes:  Any previous recommendations or follow up needs based on previous scope?  na / No recommendations.    Medical Concerns to Postpone Order:  Does patient have any of the following medical concerns that should postpone/delay colonoscopy referral?  No medical conditions affecting colonoscopy referral.    Final Referral Details:  Based on patient's medical history patient is appropriate for referral order with moderate sedation. If patient's BMI > 50 do not schedule in ASC.  "

## 2024-02-15 DIAGNOSIS — E78.00 PURE HYPERCHOLESTEROLEMIA: ICD-10-CM

## 2024-02-16 RX ORDER — SIMVASTATIN 40 MG
TABLET ORAL
Qty: 30 TABLET | Refills: 0 | Status: SHIPPED | OUTPATIENT
Start: 2024-02-16 | End: 2024-03-12

## 2024-02-21 DIAGNOSIS — K20.90 BARRETT'S ESOPHAGUS WITH ESOPHAGITIS: ICD-10-CM

## 2024-02-21 DIAGNOSIS — K22.70 BARRETT'S ESOPHAGUS WITH ESOPHAGITIS: ICD-10-CM

## 2024-02-21 RX ORDER — OMEPRAZOLE 40 MG/1
40 CAPSULE, DELAYED RELEASE ORAL DAILY
Qty: 90 CAPSULE | Refills: 1 | OUTPATIENT
Start: 2024-02-21

## 2024-02-22 ENCOUNTER — MYC REFILL (OUTPATIENT)
Dept: FAMILY MEDICINE | Facility: CLINIC | Age: 64
End: 2024-02-22
Payer: MEDICARE

## 2024-02-22 DIAGNOSIS — K22.70 BARRETT'S ESOPHAGUS WITH ESOPHAGITIS: ICD-10-CM

## 2024-02-22 DIAGNOSIS — K20.90 BARRETT'S ESOPHAGUS WITH ESOPHAGITIS: ICD-10-CM

## 2024-02-23 RX ORDER — OMEPRAZOLE 40 MG/1
40 CAPSULE, DELAYED RELEASE ORAL DAILY
Qty: 30 CAPSULE | Refills: 1 | OUTPATIENT
Start: 2024-02-23

## 2024-03-11 DIAGNOSIS — E78.00 PURE HYPERCHOLESTEROLEMIA: ICD-10-CM

## 2024-03-12 RX ORDER — SIMVASTATIN 40 MG
TABLET ORAL
Qty: 90 TABLET | Refills: 0 | Status: SHIPPED | OUTPATIENT
Start: 2024-03-12 | End: 2024-03-20

## 2024-03-13 SDOH — HEALTH STABILITY: PHYSICAL HEALTH: ON AVERAGE, HOW MANY MINUTES DO YOU ENGAGE IN EXERCISE AT THIS LEVEL?: 40 MIN

## 2024-03-13 SDOH — HEALTH STABILITY: PHYSICAL HEALTH: ON AVERAGE, HOW MANY DAYS PER WEEK DO YOU ENGAGE IN MODERATE TO STRENUOUS EXERCISE (LIKE A BRISK WALK)?: 4 DAYS

## 2024-03-13 ASSESSMENT — SOCIAL DETERMINANTS OF HEALTH (SDOH): HOW OFTEN DO YOU GET TOGETHER WITH FRIENDS OR RELATIVES?: MORE THAN THREE TIMES A WEEK

## 2024-03-18 DIAGNOSIS — K20.90 BARRETT'S ESOPHAGUS WITH ESOPHAGITIS: ICD-10-CM

## 2024-03-18 DIAGNOSIS — K22.70 BARRETT'S ESOPHAGUS WITH ESOPHAGITIS: ICD-10-CM

## 2024-03-18 RX ORDER — OMEPRAZOLE 40 MG/1
40 CAPSULE, DELAYED RELEASE ORAL DAILY
Qty: 30 CAPSULE | Refills: 0 | Status: SHIPPED | OUTPATIENT
Start: 2024-03-18 | End: 2024-03-20

## 2024-03-20 ENCOUNTER — OFFICE VISIT (OUTPATIENT)
Dept: FAMILY MEDICINE | Facility: CLINIC | Age: 64
End: 2024-03-20
Payer: MEDICARE

## 2024-03-20 VITALS
TEMPERATURE: 98.2 F | HEIGHT: 68 IN | DIASTOLIC BLOOD PRESSURE: 82 MMHG | WEIGHT: 229 LBS | HEART RATE: 76 BPM | SYSTOLIC BLOOD PRESSURE: 122 MMHG | BODY MASS INDEX: 34.71 KG/M2 | RESPIRATION RATE: 12 BRPM | OXYGEN SATURATION: 100 %

## 2024-03-20 DIAGNOSIS — E78.00 PURE HYPERCHOLESTEROLEMIA: ICD-10-CM

## 2024-03-20 DIAGNOSIS — E53.9 VITAMIN B-COMPLEX DEFICIENCY: ICD-10-CM

## 2024-03-20 DIAGNOSIS — K22.70 BARRETT'S ESOPHAGUS WITH ESOPHAGITIS: ICD-10-CM

## 2024-03-20 DIAGNOSIS — K20.90 BARRETT'S ESOPHAGUS WITH ESOPHAGITIS: ICD-10-CM

## 2024-03-20 DIAGNOSIS — D50.9 IRON DEFICIENCY ANEMIA, UNSPECIFIED IRON DEFICIENCY ANEMIA TYPE: ICD-10-CM

## 2024-03-20 DIAGNOSIS — N52.9 IMPOTENCE OF ORGANIC ORIGIN: ICD-10-CM

## 2024-03-20 DIAGNOSIS — K86.2 CYST OF PANCREAS: ICD-10-CM

## 2024-03-20 DIAGNOSIS — I10 ESSENTIAL HYPERTENSION: ICD-10-CM

## 2024-03-20 DIAGNOSIS — Z85.038 HISTORY OF COLON CANCER: ICD-10-CM

## 2024-03-20 DIAGNOSIS — Z12.5 SCREENING PSA (PROSTATE SPECIFIC ANTIGEN): ICD-10-CM

## 2024-03-20 DIAGNOSIS — G25.81 RESTLESS LEG SYNDROME: ICD-10-CM

## 2024-03-20 DIAGNOSIS — Z00.00 ANNUAL PHYSICAL EXAM: Primary | ICD-10-CM

## 2024-03-20 DIAGNOSIS — F10.10 ALCOHOL ABUSE: ICD-10-CM

## 2024-03-20 LAB
ALBUMIN SERPL BCG-MCNC: 4.6 G/DL (ref 3.5–5.2)
ALP SERPL-CCNC: 82 U/L (ref 40–150)
ALT SERPL W P-5'-P-CCNC: 39 U/L (ref 0–70)
ANION GAP SERPL CALCULATED.3IONS-SCNC: 11 MMOL/L (ref 7–15)
AST SERPL W P-5'-P-CCNC: 27 U/L (ref 0–45)
BILIRUB SERPL-MCNC: 0.3 MG/DL
BUN SERPL-MCNC: 10.6 MG/DL (ref 8–23)
CALCIUM SERPL-MCNC: 9.8 MG/DL (ref 8.8–10.2)
CHLORIDE SERPL-SCNC: 103 MMOL/L (ref 98–107)
CHOLEST SERPL-MCNC: 165 MG/DL
CREAT SERPL-MCNC: 0.73 MG/DL (ref 0.67–1.17)
DEPRECATED HCO3 PLAS-SCNC: 27 MMOL/L (ref 22–29)
EGFRCR SERPLBLD CKD-EPI 2021: >90 ML/MIN/1.73M2
ERYTHROCYTE [DISTWIDTH] IN BLOOD BY AUTOMATED COUNT: 13 % (ref 10–15)
FASTING STATUS PATIENT QL REPORTED: YES
GLUCOSE SERPL-MCNC: 110 MG/DL (ref 70–99)
HBA1C MFR BLD: 5.5 % (ref 0–5.6)
HCT VFR BLD AUTO: 43.4 % (ref 40–53)
HDLC SERPL-MCNC: 49 MG/DL
HGB BLD-MCNC: 15 G/DL (ref 13.3–17.7)
IRON BINDING CAPACITY (ROCHE): 458 UG/DL (ref 240–430)
IRON SATN MFR SERPL: 9 % (ref 15–46)
IRON SERPL-MCNC: 39 UG/DL (ref 61–157)
LDLC SERPL CALC-MCNC: 90 MG/DL
MCH RBC QN AUTO: 30.9 PG (ref 26.5–33)
MCHC RBC AUTO-ENTMCNC: 34.6 G/DL (ref 31.5–36.5)
MCV RBC AUTO: 90 FL (ref 78–100)
NONHDLC SERPL-MCNC: 116 MG/DL
PLATELET # BLD AUTO: 212 10E3/UL (ref 150–450)
POTASSIUM SERPL-SCNC: 4.6 MMOL/L (ref 3.4–5.3)
PROT SERPL-MCNC: 7.4 G/DL (ref 6.4–8.3)
PSA SERPL DL<=0.01 NG/ML-MCNC: 0.59 NG/ML (ref 0–4.5)
RBC # BLD AUTO: 4.85 10E6/UL (ref 4.4–5.9)
SODIUM SERPL-SCNC: 141 MMOL/L (ref 135–145)
TRIGL SERPL-MCNC: 128 MG/DL
VIT B12 SERPL-MCNC: 611 PG/ML (ref 232–1245)
WBC # BLD AUTO: 6.5 10E3/UL (ref 4–11)

## 2024-03-20 PROCEDURE — 85027 COMPLETE CBC AUTOMATED: CPT | Performed by: PHYSICIAN ASSISTANT

## 2024-03-20 PROCEDURE — 82607 VITAMIN B-12: CPT | Performed by: PHYSICIAN ASSISTANT

## 2024-03-20 PROCEDURE — 83550 IRON BINDING TEST: CPT | Performed by: PHYSICIAN ASSISTANT

## 2024-03-20 PROCEDURE — G0438 PPPS, INITIAL VISIT: HCPCS | Performed by: PHYSICIAN ASSISTANT

## 2024-03-20 PROCEDURE — 80061 LIPID PANEL: CPT | Performed by: PHYSICIAN ASSISTANT

## 2024-03-20 PROCEDURE — 36415 COLL VENOUS BLD VENIPUNCTURE: CPT | Performed by: PHYSICIAN ASSISTANT

## 2024-03-20 PROCEDURE — 80053 COMPREHEN METABOLIC PANEL: CPT | Performed by: PHYSICIAN ASSISTANT

## 2024-03-20 PROCEDURE — G0103 PSA SCREENING: HCPCS | Performed by: PHYSICIAN ASSISTANT

## 2024-03-20 PROCEDURE — 83540 ASSAY OF IRON: CPT | Performed by: PHYSICIAN ASSISTANT

## 2024-03-20 PROCEDURE — 99214 OFFICE O/P EST MOD 30 MIN: CPT | Mod: 25 | Performed by: PHYSICIAN ASSISTANT

## 2024-03-20 PROCEDURE — 83036 HEMOGLOBIN GLYCOSYLATED A1C: CPT | Performed by: PHYSICIAN ASSISTANT

## 2024-03-20 RX ORDER — OMEPRAZOLE 40 MG/1
40 CAPSULE, DELAYED RELEASE ORAL DAILY
Qty: 90 CAPSULE | Refills: 3 | Status: SHIPPED | OUTPATIENT
Start: 2024-03-20

## 2024-03-20 RX ORDER — RESPIRATORY SYNCYTIAL VIRUS VACCINE 120MCG/0.5
0.5 KIT INTRAMUSCULAR ONCE
Qty: 1 EACH | Refills: 0 | Status: CANCELLED | OUTPATIENT
Start: 2024-03-20 | End: 2024-03-20

## 2024-03-20 RX ORDER — FERROUS GLUCONATE 324(38)MG
324 TABLET ORAL
Qty: 90 TABLET | Refills: 3 | Status: SHIPPED | OUTPATIENT
Start: 2024-03-20

## 2024-03-20 RX ORDER — LISINOPRIL 10 MG/1
10 TABLET ORAL DAILY
Qty: 90 TABLET | Refills: 3 | Status: SHIPPED | OUTPATIENT
Start: 2024-03-20

## 2024-03-20 RX ORDER — SIMVASTATIN 40 MG
TABLET ORAL
Qty: 90 TABLET | Refills: 3 | Status: SHIPPED | OUTPATIENT
Start: 2024-03-20

## 2024-03-20 RX ORDER — CYANOCOBALAMIN 1000 UG/ML
1000 INJECTION, SOLUTION INTRAMUSCULAR; SUBCUTANEOUS
Qty: 3 ML | Refills: 1 | Status: SHIPPED | OUTPATIENT
Start: 2024-03-20 | End: 2024-09-09

## 2024-03-20 RX ORDER — HYDROCHLOROTHIAZIDE 12.5 MG/1
CAPSULE ORAL
Qty: 90 CAPSULE | Refills: 3 | Status: SHIPPED | OUTPATIENT
Start: 2024-03-20

## 2024-03-20 ASSESSMENT — ENCOUNTER SYMPTOMS
BACK PAIN: 0
PALPITATIONS: 0
SHORTNESS OF BREATH: 0
COLOR CHANGE: 0
SEIZURES: 0
HEMATURIA: 0
ARTHRALGIAS: 0
CHILLS: 0
EYE PAIN: 0
COUGH: 0
DYSURIA: 0
VOMITING: 0
SORE THROAT: 0
ABDOMINAL PAIN: 0
FEVER: 0

## 2024-03-20 NOTE — PROGRESS NOTES
Preventive Care Visit  [facility]  Keanu Álvarez PA-C, Family Medicine  Mar 20, 2024      Assessment & Plan     Annual physical exam  Will check annual labs today we will check a CBC, CMP, lipid, PSA, iron, B12 and A1c.  Up-to-date on vaccines.  - Hemoglobin A1c; Future  - Hemoglobin A1c    Essential Hypertension  Blood pressure well-controlled at 122/82.  Continue with antihypertension medications at this time.  Will check complete metabolic panel.  - Comprehensive metabolic panel (BMP + Alb, Alk Phos, ALT, AST, Total. Bili, TP); Future  - hydrochlorothiazide (MICROZIDE) 12.5 MG capsule; TAKE 1 CAPSULE BY MOUTH EVERY DAY IN THE MORNING  - lisinopril (ZESTRIL) 10 MG tablet; Take 1 tablet (10 mg) by mouth daily  - Comprehensive metabolic panel (BMP + Alb, Alk Phos, ALT, AST, Total. Bili, TP)    Essential Hypercholesterolemia  Will check a lipid panel today.  Last lipid panel 9/23 was stable with an LDL of 89, triglyceride of 60, HDL of 59.  Diet and exercise discussed today.  Continue with simvastatin 40 mg daily.  - Lipid panel reflex to direct LDL Fasting; Future  - simvastatin (ZOCOR) 40 MG tablet; TAKE 1 TABLET BY MOUTH EVERYDAY AT BEDTIME  - Lipid panel reflex to direct LDL Fasting    Male Erectile Disorder Due To Physical Condition  Utilizing Viagra as needed.  No side effects of the medication    Vargas's esophagus with esophagitis  Continue with omeprazole.  Denies any dysphagia.  Will be undergoing a repeat EGD 4/24.  - omeprazole (PRILOSEC) 40 MG DR capsule; Take 1 capsule (40 mg) by mouth daily    Cyst of pancreas  Pancreatic cyst noted back in 2020.  He has since had an MRI of his pancreas on 8/23 that measured a 1.5 cm pancreatic cyst at the tail.  There was no change from his prior imaging in 2020.  He does follow with gastroenterology.    Iron deficiency anemia, of uncertain etiology  History of iron deficiency anemia.  He is on iron supplements.  Will check a CBC and  "iron level today.  He does have a colonoscopy set up 4/24  - CBC with platelets; Future  - Iron and iron binding capacity; Future  - CBC with platelets  - Iron and iron binding capacity    History of colon cancer  History of colon cancer in 2001.  Next colonoscopy set up 4/24.  Following closely with gastroenterology.  No new symptoms    Vitamin B-complex deficiency  Continue with B12 injections monthly.  Will check a B12 level and a CBC.  - CBC with platelets; Future  - Vitamin B12; Future  - cyanocobalamin (CYANOCOBALAMIN) 1000 MCG/ML injection; Inject 1 mL (1,000 mcg) into the muscle every 30 days  - CBC with platelets  - Vitamin B12    Alcohol abuse  He has cut back on alcohol use which has helped with his paresthesias.    Screening PSA (prostate specific antigen)  Will check a screening PSA  - PSA, screen; Future  - PSA, screen    Colon cancer screening  Colonoscopy set up/24    Restless leg syndrome  Continue with iron supplements.  New symptoms.  - ferrous gluconate (FERGON) 324 (38 Fe) MG tablet; Take 1 tablet (324 mg) by mouth daily (with breakfast) Take with 2oz of citrus juice or a vitamin C supplement to aide in absorption.     Follow-up Visit   Expected date:  Mar 20, 2025 (Approximate)      Follow Up Appointment Details:     Follow-up with whom?: Me    Follow-Up for what?: Medicare Wellness    Welcome or Annual?: Welcome to Medicare    How?: In Person                     Current Outpatient Medications   Medication    aspirin 81 mg chewable tablet    cyanocobalamin (CYANOCOBALAMIN) 1000 MCG/ML injection    ferrous gluconate (FERGON) 324 (38 Fe) MG tablet    hydrochlorothiazide (MICROZIDE) 12.5 MG capsule    lisinopril (ZESTRIL) 10 MG tablet    multivitamin therapeutic (THERAGRAN) tablet    omeprazole (PRILOSEC) 40 MG DR capsule    sildenafil (VIAGRA) 100 MG tablet    simvastatin (ZOCOR) 40 MG tablet    SYRINGE, DISPOSABLE, MISC    Syringe/Needle, Disp, 20G X 1-1/2\" 3 ML MISC     No current " "facility-administered medications for this visit.         Patient has been advised of split billing requirements and indicates understanding: Yes        BMI  Estimated body mass index is 34.82 kg/m  as calculated from the following:    Height as of this encounter: 1.727 m (5' 8\").    Weight as of this encounter: 103.9 kg (229 lb).   Weight management plan: Discussed healthy diet and exercise guidelines    Counseling  Appropriate preventive services were discussed with this patient, including applicable screening as appropriate for fall prevention, nutrition, physical activity, Tobacco-use cessation, weight loss and cognition.  Checklist reviewing preventive services available has been given to the patient.  Reviewed patient's diet, addressing concerns and/or questions.   The patient reports drinking more than one alcoholic drink per day and sometimes engages in binge or excessive drinking. The patient was counseled and given information about possible harmful effects of excessive alcohol intake as well as where to get help for alcohol problems.       Subjective   Norris is a 64 year old, presenting for the following:  Annual Visit ([facility] Annual medicare wellness)        3/20/2024     8:44 AM   Additional Questions   Roomed by Kia Fink   Accompanied by none         Health Care Directive  Patient does not have a Health Care Directive or Living Will: Discussed advance care planning with patient; however, patient declined at this time.    Norris is a pleasant 64-year-old male who presents to the clinic today for a Medicare annual wellness visit.  Overall Norris is doing well.  No questions or concerns regarding his chronic health.  Past medical history significant for hypertension, hyperlipidemia, GERD, Vargas's esophagus, iron deficiency anemia, B12, paresthesia in his upper and lower extremities, colon cancer, and pancreatic cyst.    Medications include Zocor, Viagra, omeprazole, lisinopril, hydrochlorothiazide, " iron, B12 and aspirin.    He was having some paresthesia in his upper and lower extremities.  He did follow-up with neurology 4/23.  Workup at that time was unremarkable.  It was thought that his numbness and tingling is due to his alcohol use.  He also has B12 deficiency and does injections.  He states that he has cut back on his alcohol use quite a bit and the paresthesias have improved.  He is not having any new symptoms.            3/13/2024   General Health   How would you rate your overall physical health? Good   Feel stress (tense, anxious, or unable to sleep) Not at all         3/13/2024   Nutrition   Diet: Regular (no restrictions)         3/13/2024   Exercise   Days per week of moderate/strenous exercise 4 days   Average minutes spent exercising at this level 40 min         3/13/2024   Social Factors   Frequency of gathering with friends or relatives More than three times a week   Worry food won't last until get money to buy more No   Food not last or not have enough money for food? No   Do you have housing?  Yes   Are you worried about losing your housing? No   Lack of transportation? No   Unable to get utilities (heat,electricity)? No         3/13/2024   Fall Risk   Fallen 2 or more times in the past year? No   Trouble with walking or balance? No          3/13/2024   Activities of Daily Living- Home Safety   Needs help with the following daily activites None of the above   Safety concerns in the home None of the above         3/13/2024   Dental   Dentist two times every year? Yes         3/13/2024   Hearing Screening   Hearing concerns? None of the above         3/13/2024   Driving Risk Screening   Patient/family members have concerns about driving No         3/13/2024   General Alertness/Fatigue Screening   Have you been more tired than usual lately? No         3/13/2024   Urinary Incontinence Screening   Bothered by leaking urine in past 6 months No         3/13/2024   TB Screening   Were you born  outside of the [place]? No         Today's PHQ-2 Score:       3/19/2024     9:32 AM   PHQ-2 ( 1999 [employer])   Q1: Little interest or pleasure in doing things 0   Q2: Feeling down, depressed or hopeless 0   PHQ-2 Score 0   Q1: Little interest or pleasure in doing things Not at all   Q2: Feeling down, depressed or hopeless Not at all   PHQ-2 Score 0           3/13/2024   Substance Use   Alcohol more than 3/day or more than 7/wk Yes   How often do you have a drink containing alcohol 2 to 3 times a week   How many alcohol drinks on typical day 3 or 4   How often do you have 5+ drinks at one occasion Less than monthly   Audit 2/3 Score 2   How often not able to stop drinking once started Never   How often failed to do what normally expected Never   How often needed first drink in am after a heavy drinking session Never   How often feeling of guilt or remorse after drinking Never   How often unable to remember what happened the night before Never   Have you or someone else been injured because of your drinking No   Has anyone been concerned or suggested you cut down on drinking Yes, but not in the last year   TOTAL SCORE - AUDIT 7   Do you have a current opioid prescription? No   How severe/bad is pain from 1 to 10? 0/10 (No Pain)   Do you use any other substances recreationally? (!) ALCOHOL     Social History     Tobacco Use    Smoking status: Former     Packs/day: 0.00     Years: 5.00     Additional pack years: 0.00     Total pack years: 0.00     Types: Cigarettes     Passive exposure: Never    Smokeless tobacco: Former     Quit date: 3/25/1985   Vaping Use    Vaping Use: Never used   Substance Use Topics    Alcohol use: Yes     Alcohol/week: 12.0 standard drinks of alcohol     Types: 12 Standard drinks or equivalent per week    Drug use: No       Last PSA:   Prostate Specific Antigen Screen   Date Value Ref Range Status   09/20/2022 0.54 0.00 - 4.50 ng/mL Final   10/06/2021 0.56 0.00 - 4.50 ug/L Final     ASCVD Risk    The 10-year ASCVD risk score (Mita GREENBERG, et al., 2019) is: 11.6%    Values used to calculate the score:      Age: 64 years      Sex: Male      Is Non- : No      Diabetic: No      Tobacco smoker: No      Systolic Blood Pressure: 122 mmHg      Is BP treated: Yes      HDL Cholesterol: 59 mg/dL      Total Cholesterol: 160 mg/dL            Reviewed and updated as needed this visit by Provider                    Past Medical History:   Diagnosis Date    Colon cancer (H)     Generalized abdominal pain     Hypertension     Hypokalemia      Past Surgical History:   Procedure Laterality Date    ABDOMEN SURGERY      APPENDECTOMY      COLON SURGERY      COLONOSCOPY      LAPAROSCOPIC CHOLECYSTECTOMY  02/08/2021     Current providers sharing in care for this patient include:  Patient Care Team:  Keanu Álvarez PA-C as PCP - General (Family Medicine)  Keanu Álvarez PA-C as Assigned PCP  Vic Jain DO as MD (Neurology)  Vic Jain DO as Assigned Neuroscience Provider    The following health maintenance items are reviewed in Epic and correct as of today:  Health Maintenance   Topic Date Due    RSV VACCINE (Pregnancy & 60+) (1 - 1-dose 60+ series) Never done    ANNUAL REVIEW OF HM ORDERS  10/06/2022    Pneumococcal Vaccine: 65+ Years (1 of 1 - PCV) Never done    LIPID  09/20/2023    COLORECTAL CANCER SCREENING  04/22/2024    MEDICARE ANNUAL WELLNESS VISIT  03/20/2025    FALL RISK ASSESSMENT  03/20/2025    GLUCOSE  03/20/2026    ADVANCE CARE PLANNING  03/20/2028    DTAP/TDAP/TD IMMUNIZATION (2 - Td or Tdap) 05/20/2029    HEPATITIS C SCREENING  Completed    PHQ-2 (once per calendar year)  Completed    INFLUENZA VACCINE  Completed    ZOSTER IMMUNIZATION  Completed    AORTIC ANEURYSM SCREENING (SYSTEM ASSIGNED)  Completed    COVID-19 Vaccine  Completed    IPV IMMUNIZATION  Aged Out    HPV IMMUNIZATION  Aged Out    MENINGITIS IMMUNIZATION  Aged Out    RSV  "MONOCLONAL ANTIBODY  Aged Out    LUNG CANCER SCREENING  Discontinued       Review of Systems   Constitutional:  Negative for chills and fever.   HENT:  Negative for ear pain and sore throat.    Eyes:  Negative for pain and visual disturbance.   Respiratory:  Negative for cough and shortness of breath.    Cardiovascular:  Negative for chest pain and palpitations.   Gastrointestinal:  Negative for abdominal pain and vomiting.   Genitourinary:  Negative for dysuria and hematuria.   Musculoskeletal:  Negative for arthralgias and back pain.   Skin:  Negative for color change and rash.   Neurological:  Negative for seizures and syncope.   All other systems reviewed and are negative.         Objective    Exam  /82 (BP Location: Right arm, Patient Position: Sitting, Cuff Size: Adult Regular)   Pulse 76   Temp 98.2  F (36.8  C) (Oral)   Resp 12   Ht 1.727 m (5' 8\")   Wt 103.9 kg (229 lb)   SpO2 100%   BMI 34.82 kg/m     Estimated body mass index is 34.82 kg/m  as calculated from the following:    Height as of this encounter: 1.727 m (5' 8\").    Weight as of this encounter: 103.9 kg (229 lb).    Physical Exam  Vitals and nursing note reviewed.   Constitutional:       General: He is not in acute distress.     Appearance: Normal appearance. He is well-developed and well-groomed. He is not ill-appearing or toxic-appearing.   HENT:      Head: Normocephalic and atraumatic.      Right Ear: Tympanic membrane, ear canal and external ear normal.      Left Ear: Tympanic membrane, ear canal and external ear normal.      Mouth/Throat:      Lips: Pink.      Mouth: Mucous membranes are moist.      Palate: No mass.      Pharynx: Oropharynx is clear. Uvula midline.      Tonsils: No tonsillar exudate or tonsillar abscesses.   Eyes:      General: Lids are normal.         Right eye: No discharge.         Left eye: No discharge.   Neck:      Trachea: Trachea normal.   Cardiovascular:      Rate and Rhythm: Normal rate and regular " rhythm.      Heart sounds: S1 normal and S2 normal. No murmur heard.  Pulmonary:      Effort: Pulmonary effort is normal.      Breath sounds: Normal breath sounds and air entry.   Abdominal:      General: Bowel sounds are normal. There is no distension.      Palpations: Abdomen is soft.      Tenderness: There is no abdominal tenderness. There is no guarding or rebound.   Musculoskeletal:      Cervical back: Full passive range of motion without pain and neck supple.      Right lower leg: No edema.      Left lower leg: No edema.   Lymphadenopathy:      Cervical: No cervical adenopathy.   Skin:     General: Skin is warm and dry.      Findings: No lesion or rash.   Neurological:      General: No focal deficit present.      Mental Status: He is alert.      Cranial Nerves: No cranial nerve deficit.      Gait: Gait is intact.      Deep Tendon Reflexes:      Reflex Scores:       Patellar reflexes are 2+ on the right side and 2+ on the left side.  Psychiatric:         Attention and Perception: Attention normal.         Mood and Affect: Mood normal.         Speech: Speech normal.             3/20/2024   Mini Cog   Clock Draw Score 2 Normal   3 Item Recall 3 objects recalled   Mini Cog Total Score 5              Signed Electronically by: Keanu Álvarez PA-C

## 2024-04-19 ENCOUNTER — PATIENT OUTREACH (OUTPATIENT)
Dept: GASTROENTEROLOGY | Facility: CLINIC | Age: 64
End: 2024-04-19
Payer: MEDICARE

## 2024-04-22 ENCOUNTER — TRANSFERRED RECORDS (OUTPATIENT)
Dept: HEALTH INFORMATION MANAGEMENT | Facility: CLINIC | Age: 64
End: 2024-04-22
Payer: MEDICARE

## 2024-05-02 ENCOUNTER — TRANSFERRED RECORDS (OUTPATIENT)
Dept: HEALTH INFORMATION MANAGEMENT | Facility: CLINIC | Age: 64
End: 2024-05-02
Payer: MEDICARE

## 2024-05-06 ENCOUNTER — PATIENT OUTREACH (OUTPATIENT)
Dept: GASTROENTEROLOGY | Facility: CLINIC | Age: 64
End: 2024-05-06
Payer: MEDICARE

## 2024-07-01 ENCOUNTER — ANCILLARY PROCEDURE (OUTPATIENT)
Dept: GENERAL RADIOLOGY | Facility: CLINIC | Age: 64
End: 2024-07-01
Attending: FAMILY MEDICINE
Payer: MEDICARE

## 2024-07-01 ENCOUNTER — OFFICE VISIT (OUTPATIENT)
Dept: FAMILY MEDICINE | Facility: CLINIC | Age: 64
End: 2024-07-01
Payer: MEDICARE

## 2024-07-01 VITALS
SYSTOLIC BLOOD PRESSURE: 130 MMHG | HEIGHT: 68 IN | HEART RATE: 88 BPM | OXYGEN SATURATION: 98 % | DIASTOLIC BLOOD PRESSURE: 80 MMHG | RESPIRATION RATE: 16 BRPM | BODY MASS INDEX: 34.1 KG/M2 | TEMPERATURE: 98.4 F | WEIGHT: 225 LBS

## 2024-07-01 DIAGNOSIS — M25.532 LEFT WRIST PAIN: ICD-10-CM

## 2024-07-01 DIAGNOSIS — M25.532 LEFT WRIST PAIN: Primary | ICD-10-CM

## 2024-07-01 PROCEDURE — 73110 X-RAY EXAM OF WRIST: CPT | Mod: TC | Performed by: RADIOLOGY

## 2024-07-01 PROCEDURE — 99213 OFFICE O/P EST LOW 20 MIN: CPT | Performed by: FAMILY MEDICINE

## 2024-07-01 RX ORDER — RESPIRATORY SYNCYTIAL VIRUS VACCINE 120MCG/0.5
0.5 KIT INTRAMUSCULAR ONCE
Qty: 1 EACH | Refills: 0 | Status: CANCELLED | OUTPATIENT
Start: 2024-07-01 | End: 2024-07-01

## 2024-07-01 NOTE — PROGRESS NOTES
"  Assessment & Plan     (M25.532) Left wrist pain  (primary encounter diagnosis)  Comment: pt noticed of left wrist pain for 2 weeks. Localized Pain triggered by swinging the golf club, grabbing heavy objects or wrist movement. No swelling, no recall of injury. Exam: a tenderness area on left wrist medical side of palm. No swelling. X ray no fracture. Likely bone and soft tissue bruised  .   Plan: XR Wrist Left G/E 3 Views        Advise rest and ice. Otc tylenol prn. Follow-up if pain worst or persists.        Subjective   Norris is a 64 year old, presenting for the following health issues:  Hand Pain (X 2 weeks on/ off- first noticed after swinging the golf club. Pain occurs with movement turning one way or another, grabbing heavy objects, pain located part of palm)        7/1/2024     9:16 AM   Additional Questions   Roomed by Gen RICHARD CMA     Hand Pain    History of Present Illness       Reason for visit:  Wrist/ hand pain    He eats 2-3 servings of fruits and vegetables daily.He consumes 0 sweetened beverage(s) daily.He exercises with enough effort to increase his heart rate 30 to 60 minutes per day.  He exercises with enough effort to increase his heart rate 3 or less days per week.   He is taking medications regularly.                 Review of Systems  Constitutional, HEENT, cardiovascular, pulmonary, gi and gu systems are negative, except as otherwise noted.      Objective    /80 (BP Location: Right arm, Patient Position: Sitting, Cuff Size: Adult Regular)   Pulse 88   Temp 98.4  F (36.9  C) (Oral)   Resp 16   Ht 1.727 m (5' 8\")   Wt 102.1 kg (225 lb)   SpO2 98%   BMI 34.21 kg/m    Body mass index is 34.21 kg/m .  Physical Exam   GENERAL: alert and no distress  MS: no gross musculoskeletal defects noted, no edema  Wrist exam - both sides  full range of motion, no swelling,  or deformities. Normal radial pulse. Negative Phalen/Tinel signs. A tenderness area on medical side of palm          "     Signed Electronically by: Terri Murcia MD

## 2024-09-08 DIAGNOSIS — E53.9 VITAMIN B-COMPLEX DEFICIENCY: ICD-10-CM

## 2024-09-09 RX ORDER — CYANOCOBALAMIN 1000 UG/ML
1000 INJECTION, SOLUTION INTRAMUSCULAR; SUBCUTANEOUS
Qty: 3 ML | Refills: 1 | Status: SHIPPED | OUTPATIENT
Start: 2024-09-09

## 2024-11-26 ENCOUNTER — NURSE TRIAGE (OUTPATIENT)
Dept: FAMILY MEDICINE | Facility: CLINIC | Age: 64
End: 2024-11-26
Payer: MEDICARE

## 2024-11-26 NOTE — TELEPHONE ENCOUNTER
"Nurse Triage SBAR    Is this a 2nd Level Triage? YES, LICENSED PRACTITIONER REVIEW IS REQUIRED    Situation: Peter message:  \"hi doctor i have this cough that doesnt want to go away iwould like to know if you could prescribe somethiing or do i need a appointment\"    Background: Reports cough and runny nose has been present for about 3 weeks.     Reports negative COVID test.     Assessment: Patient reports that cough comes and goes. States will get \"a little tickle in throat\", then turns into a hack until is able to clear things.     Reports sputum is \"greenish\".     [place] cough on phone, sounded very tight and hacking.     Patient states cough keeps him up at night.     Reports occasional, mild wheezing.     Denies coughing up blood, difficulty breathing, fever, chest pain.     Protocol Recommended Disposition:   Go To Office Now    Recommendation: Advised patient to go to urgent care now as no available appointments in clinic. Patient declined stating he will not go to urgent care and cannot be seen today.     Scheduled patient for office visit at [facility] tomorrow at 3:30pm.     Patient requesting PCP to review and prescribe medication based on symptoms. Patient would prefer not to have office visit if possible.     Routing to provider to review and advise.     Does the patient meet one of the following criteria for ADS visit consideration? 16+ years old, with an [facility] PCP     TIP  Providers, please consider if this condition is appropriate for management at one of our Acute and Diagnostic Services sites.     If patient is a good candidate, please use dotphrase <dot>triageresponse and select Refer to ADS to document.    Reason for Disposition   Wheezing is present    Additional Information   Negative: Bluish (or gray) lips or face   Negative: SEVERE difficulty breathing (e.g., struggling for each breath, speaks in single words)   Negative: Rapid onset of cough and has hives   Negative: Coughing started " "suddenly after medicine, an allergic food or bee sting   Negative: Difficulty breathing after exposure to flames, smoke, or fumes   Negative: Sounds like a life-threatening emergency to the triager   Negative: Previous asthma attacks and this feels like asthma attack   Negative: Dry cough (non-productive; no sputum or minimal clear sputum) and within 14 days of COVID-19 Exposure   Negative: MODERATE difficulty breathing (e.g., speaks in phrases, SOB even at rest, pulse 100-120) and still present when not coughing   Negative: Chest pain present when not coughing   Negative: Passed out (e.g., fainted, lost consciousness, blacked out and was not responding)   Negative: Patient sounds very sick or weak to the triager   Negative: MILD difficulty breathing (e.g., minimal/no SOB at rest, SOB with walking, pulse <100) and still present when not coughing   Negative: Coughed up > 1 tablespoon (15 ml) blood (Exception: Blood-tinged sputum.)   Negative: Fever > 103 F (39.4 C)   Negative: Fever > 101 F (38.3 C) and over 60 years of age   Negative: Fever > 100 F (37.8 C) and has diabetes mellitus or a weak immune system (e.g., HIV positive, cancer chemotherapy, organ transplant, splenectomy, chronic steroids)   Negative: Fever > 100 F (37.8 C) and bedridden (e.g., CVA, chronic illness, recovering from surgery)   Negative: Increasing ankle swelling    Answer Assessment - Initial Assessment Questions  1. ONSET: \"When did the cough begin?\"       3 weeks ago (roughly)   2. SEVERITY: \"How bad is the cough today?\"       Comes and goes, get a little tickle, then turns into a hack until get things cleared up.   3. SPUTUM: \"Describe the color of your sputum\" (e.g., none, dry cough; clear, white, yellow, green)      Greenish  4. HEMOPTYSIS: \"Are you coughing up any blood?\" If Yes, ask: \"How much?\" (e.g., flecks, streaks, tablespoons, etc.)      No   5. DIFFICULTY BREATHING: \"Are you having difficulty breathing?\" If Yes, ask: \"How bad is " "it?\" (e.g., mild, moderate, severe)       No   6. FEVER: \"Do you have a fever?\" If Yes, ask: \"What is your temperature, how was it measured, and when did it start?\"      No   7. CARDIAC HISTORY: \"Do you have any history of heart disease?\" (e.g., heart attack, congestive heart failure)       No   8. LUNG HISTORY: \"Do you have any history of lung disease?\"  (e.g., pulmonary embolus, asthma, emphysema)      No   9. PE RISK FACTORS: \"Do you have a history of blood clots?\" (or: recent major surgery, recent prolonged travel, bedridden)      No   10. OTHER SYMPTOMS: \"Do you have any other symptoms?\" (e.g., runny nose, wheezing, chest pain)        Runny nose, cough, mild wheezing  No chest pain  11. PREGNANCY: \"Is there any chance you are pregnant?\" \"When was your last menstrual period?\"        N/a  12. TRAVEL: \"Have you traveled out of the country in the last month?\" (e.g., travel history, exposures)        No    Protocols used: Cough-A-ANAID Calle RN  [facility]  "

## 2024-11-26 NOTE — TELEPHONE ENCOUNTER
Provider Recommendation Follow Up:   Reached patient/caregiver. Informed of provider's recommendations. Patient verbalized understanding and agrees with the plan.     Sobia Calle RN  [facility]

## 2024-11-27 ENCOUNTER — OFFICE VISIT (OUTPATIENT)
Dept: FAMILY MEDICINE | Facility: CLINIC | Age: 64
End: 2024-11-27
Payer: MEDICARE

## 2024-11-27 VITALS
TEMPERATURE: 98.6 F | RESPIRATION RATE: 14 BRPM | WEIGHT: 218 LBS | BODY MASS INDEX: 33.15 KG/M2 | OXYGEN SATURATION: 97 % | SYSTOLIC BLOOD PRESSURE: 138 MMHG | DIASTOLIC BLOOD PRESSURE: 74 MMHG | HEART RATE: 96 BPM

## 2024-11-27 DIAGNOSIS — R05.2 SUBACUTE COUGH: Primary | ICD-10-CM

## 2024-11-27 PROCEDURE — 99213 OFFICE O/P EST LOW 20 MIN: CPT | Performed by: NURSE PRACTITIONER

## 2024-11-27 RX ORDER — AZITHROMYCIN 250 MG/1
TABLET, FILM COATED ORAL
Qty: 6 TABLET | Refills: 0 | Status: SHIPPED | OUTPATIENT
Start: 2024-11-27 | End: 2024-12-02

## 2024-11-27 ASSESSMENT — ENCOUNTER SYMPTOMS: COUGH: 1

## 2024-11-27 NOTE — PROGRESS NOTES
"  Assessment & Plan     Subacute cough  This is a 64-year-old male with past medical history of diaphragmatic hernia, B12 deficiency, small bowel obstruction has since resolved, Vargas's esophagus with esophagitis on omeprazole 40 mg daily, chronic cholecystectomy syndrome, pancreatic cyst, HLD, HTN, anemia, alcohol abuse, presenting today with 3 to 4 weeks of dry hacking cough, occasionally productive.    Has been taking all of his medications regularly.  No significant physical exam's findings, negative for chest pressure, wheezing, rhonchi, fever, congestion, esophageal pain.    Discussed with patient differential diagnosis will give him a walking prescription for antibiotics for possible pneumonia however discussed with patient that he should watch and wait continue with rest and increased hydration.  If no improvement or worsening in the next week may take the Z-Jourdan.  If his symptoms persist he should follow-up with his primary care provider, possibly reevaluate for uncontrolled GERD.    Patient verbalized understanding and agrees with plan of care    - azithromycin (ZITHROMAX) 250 MG tablet  Dispense: 6 tablet; Refill: 0            BMI  Estimated body mass index is 33.15 kg/m  as calculated from the following:    Height as of 7/1/24: 1.727 m (5' 8\").    Weight as of this encounter: 98.9 kg (218 lb).   Weight management plan: Patient was referred to their PCP to discuss a diet and exercise plan.          Mark Pisano is a 64 year old, presenting for the following health issues:  Cough (Cough, mild wheezing, runny nose ongoing for over 3 weeks. More consistent when going outside and at night. Tried cough syrup and cough drops.)        11/27/2024     2:11 PM   Additional Questions   Roomed by FRANCK BRADEN     History of Present Illness       Reason for visit:  Nagging cough  Symptom onset:  3-4 weeks ago  Symptoms include:  Cough runny nose  Symptom intensity:  Moderate  Symptom progression:  Improving  Had these " symptoms before:  No  What makes it worse:  No   He is taking medications regularly.     Wife also has cough was given an antibiotic,   Small runny nose, No N/V, abd pain, no SOB wheeze, no Chest pain, no facial pina or pressure, no fever.    Starts as a tickle and cough to get rid of the tickle.     Cough worse in cold air and at night.     Has acid reflux    Lisinopril for many years  Omeprazole 40mg daily for barrettes esophagus.                      Objective    /74 (BP Location: Right arm, Patient Position: Sitting, Cuff Size: Adult Large)   Pulse 96   Temp 98.6  F (37  C)   Resp 14   Wt 98.9 kg (218 lb)   SpO2 97%   BMI 33.15 kg/m    Body mass index is 33.15 kg/m .  Physical Exam   GENERAL: alert and no distress  HENT: ear canals and TM's normal, nose and mouth without ulcers or lesions  NECK: no adenopathy, no asymmetry, masses, or scars  RESP: lungs clear to auscultation - no rales, rhonchi or wheezes  CV: regular rate and rhythm, normal S1 S2, no S3 or S4, no murmur, click or rub, no peripheral edema  ABDOMEN: soft, nontender, no hepatosplenomegaly, no masses and bowel sounds normal  MS: no gross musculoskeletal defects noted, no edema            Signed Electronically by: ANKUR Torres CNP

## 2025-02-02 DIAGNOSIS — K22.70 BARRETT'S ESOPHAGUS WITH ESOPHAGITIS: ICD-10-CM

## 2025-02-02 DIAGNOSIS — K20.90 BARRETT'S ESOPHAGUS WITH ESOPHAGITIS: ICD-10-CM

## 2025-02-03 RX ORDER — OMEPRAZOLE 40 MG/1
40 CAPSULE, DELAYED RELEASE ORAL DAILY
Qty: 90 CAPSULE | Refills: 2 | Status: SHIPPED | OUTPATIENT
Start: 2025-02-03

## 2025-02-04 ENCOUNTER — TRANSFERRED RECORDS (OUTPATIENT)
Dept: HEALTH INFORMATION MANAGEMENT | Facility: CLINIC | Age: 65
End: 2025-02-04
Payer: MEDICARE

## 2025-02-18 ENCOUNTER — PATIENT OUTREACH (OUTPATIENT)
Dept: CARE COORDINATION | Facility: CLINIC | Age: 65
End: 2025-02-18
Payer: MEDICARE

## 2025-03-04 DIAGNOSIS — E53.9 VITAMIN B-COMPLEX DEFICIENCY: ICD-10-CM

## 2025-03-04 RX ORDER — CYANOCOBALAMIN 1000 UG/ML
1000 INJECTION, SOLUTION INTRAMUSCULAR; SUBCUTANEOUS
Qty: 3 ML | Refills: 1 | Status: SHIPPED | OUTPATIENT
Start: 2025-03-04

## 2025-03-19 SDOH — HEALTH STABILITY: PHYSICAL HEALTH: ON AVERAGE, HOW MANY MINUTES DO YOU ENGAGE IN EXERCISE AT THIS LEVEL?: 40 MIN

## 2025-03-19 SDOH — HEALTH STABILITY: PHYSICAL HEALTH: ON AVERAGE, HOW MANY DAYS PER WEEK DO YOU ENGAGE IN MODERATE TO STRENUOUS EXERCISE (LIKE A BRISK WALK)?: 4 DAYS

## 2025-03-19 ASSESSMENT — SOCIAL DETERMINANTS OF HEALTH (SDOH): HOW OFTEN DO YOU GET TOGETHER WITH FRIENDS OR RELATIVES?: MORE THAN THREE TIMES A WEEK

## 2025-03-24 ENCOUNTER — OFFICE VISIT (OUTPATIENT)
Dept: FAMILY MEDICINE | Facility: CLINIC | Age: 65
End: 2025-03-24
Payer: COMMERCIAL

## 2025-03-24 VITALS
HEART RATE: 88 BPM | TEMPERATURE: 98.3 F | SYSTOLIC BLOOD PRESSURE: 136 MMHG | RESPIRATION RATE: 16 BRPM | HEIGHT: 67 IN | WEIGHT: 233 LBS | DIASTOLIC BLOOD PRESSURE: 76 MMHG | OXYGEN SATURATION: 96 % | BODY MASS INDEX: 36.57 KG/M2

## 2025-03-24 DIAGNOSIS — E66.812 CLASS 2 SEVERE OBESITY WITH BODY MASS INDEX (BMI) OF 35 TO 39.9 WITH SERIOUS COMORBIDITY (H): ICD-10-CM

## 2025-03-24 DIAGNOSIS — E78.00 PURE HYPERCHOLESTEROLEMIA: ICD-10-CM

## 2025-03-24 DIAGNOSIS — G25.81 RESTLESS LEG SYNDROME: ICD-10-CM

## 2025-03-24 DIAGNOSIS — K20.90 BARRETT'S ESOPHAGUS WITH ESOPHAGITIS: ICD-10-CM

## 2025-03-24 DIAGNOSIS — N52.9 ERECTILE DYSFUNCTION, UNSPECIFIED ERECTILE DYSFUNCTION TYPE: ICD-10-CM

## 2025-03-24 DIAGNOSIS — I10 ESSENTIAL HYPERTENSION: ICD-10-CM

## 2025-03-24 DIAGNOSIS — D50.9 IRON DEFICIENCY ANEMIA, UNSPECIFIED IRON DEFICIENCY ANEMIA TYPE: ICD-10-CM

## 2025-03-24 DIAGNOSIS — E53.8 VITAMIN B12 DEFICIENCY: ICD-10-CM

## 2025-03-24 DIAGNOSIS — Z12.5 SCREENING FOR PROSTATE CANCER: ICD-10-CM

## 2025-03-24 DIAGNOSIS — N52.9 IMPOTENCE OF ORGANIC ORIGIN: ICD-10-CM

## 2025-03-24 DIAGNOSIS — E66.01 CLASS 2 SEVERE OBESITY WITH BODY MASS INDEX (BMI) OF 35 TO 39.9 WITH SERIOUS COMORBIDITY (H): ICD-10-CM

## 2025-03-24 DIAGNOSIS — Z00.00 ANNUAL PHYSICAL EXAM: Primary | ICD-10-CM

## 2025-03-24 DIAGNOSIS — F10.10 ALCOHOL ABUSE: ICD-10-CM

## 2025-03-24 DIAGNOSIS — E53.8 VITAMIN B12 DEFICIENCY (NON ANEMIC): ICD-10-CM

## 2025-03-24 DIAGNOSIS — K86.2 CYST OF PANCREAS: ICD-10-CM

## 2025-03-24 DIAGNOSIS — E53.9 VITAMIN B-COMPLEX DEFICIENCY: ICD-10-CM

## 2025-03-24 DIAGNOSIS — K22.70 BARRETT'S ESOPHAGUS WITH ESOPHAGITIS: ICD-10-CM

## 2025-03-24 LAB
ALBUMIN SERPL BCG-MCNC: 4.4 G/DL (ref 3.5–5.2)
ALP SERPL-CCNC: 79 U/L (ref 40–150)
ALT SERPL W P-5'-P-CCNC: 36 U/L (ref 0–70)
ANION GAP SERPL CALCULATED.3IONS-SCNC: 12 MMOL/L (ref 7–15)
AST SERPL W P-5'-P-CCNC: 29 U/L (ref 0–45)
BILIRUB SERPL-MCNC: 0.6 MG/DL
BUN SERPL-MCNC: 11.4 MG/DL (ref 8–23)
CALCIUM SERPL-MCNC: 9.7 MG/DL (ref 8.8–10.4)
CHLORIDE SERPL-SCNC: 100 MMOL/L (ref 98–107)
CHOLEST SERPL-MCNC: 184 MG/DL
CREAT SERPL-MCNC: 0.82 MG/DL (ref 0.67–1.17)
EGFRCR SERPLBLD CKD-EPI 2021: >90 ML/MIN/1.73M2
ERYTHROCYTE [DISTWIDTH] IN BLOOD BY AUTOMATED COUNT: 12.4 % (ref 10–15)
FASTING STATUS PATIENT QL REPORTED: YES
FASTING STATUS PATIENT QL REPORTED: YES
FERRITIN SERPL-MCNC: 46 NG/ML (ref 31–409)
GLUCOSE SERPL-MCNC: 99 MG/DL (ref 70–99)
HCO3 SERPL-SCNC: 25 MMOL/L (ref 22–29)
HCT VFR BLD AUTO: 44.4 % (ref 40–53)
HDLC SERPL-MCNC: 54 MG/DL
HGB BLD-MCNC: 15.4 G/DL (ref 13.3–17.7)
IRON BINDING CAPACITY (ROCHE): 426 UG/DL (ref 240–430)
IRON SATN MFR SERPL: 19 % (ref 15–46)
IRON SERPL-MCNC: 80 UG/DL (ref 61–157)
LDLC SERPL CALC-MCNC: 100 MG/DL
MCH RBC QN AUTO: 31 PG (ref 26.5–33)
MCHC RBC AUTO-ENTMCNC: 34.7 G/DL (ref 31.5–36.5)
MCV RBC AUTO: 90 FL (ref 78–100)
NONHDLC SERPL-MCNC: 130 MG/DL
PLATELET # BLD AUTO: 194 10E3/UL (ref 150–450)
POTASSIUM SERPL-SCNC: 4.7 MMOL/L (ref 3.4–5.3)
PROT SERPL-MCNC: 7.5 G/DL (ref 6.4–8.3)
PSA SERPL DL<=0.01 NG/ML-MCNC: 0.94 NG/ML (ref 0–4.5)
RBC # BLD AUTO: 4.96 10E6/UL (ref 4.4–5.9)
SODIUM SERPL-SCNC: 137 MMOL/L (ref 135–145)
TRIGL SERPL-MCNC: 148 MG/DL
VIT B12 SERPL-MCNC: 402 PG/ML (ref 232–1245)
WBC # BLD AUTO: 6.3 10E3/UL (ref 4–11)

## 2025-03-24 PROCEDURE — G0103 PSA SCREENING: HCPCS | Performed by: PHYSICIAN ASSISTANT

## 2025-03-24 PROCEDURE — 83550 IRON BINDING TEST: CPT | Performed by: PHYSICIAN ASSISTANT

## 2025-03-24 PROCEDURE — 83540 ASSAY OF IRON: CPT | Performed by: PHYSICIAN ASSISTANT

## 2025-03-24 PROCEDURE — 85027 COMPLETE CBC AUTOMATED: CPT | Performed by: PHYSICIAN ASSISTANT

## 2025-03-24 PROCEDURE — 80053 COMPREHEN METABOLIC PANEL: CPT | Performed by: PHYSICIAN ASSISTANT

## 2025-03-24 PROCEDURE — 36415 COLL VENOUS BLD VENIPUNCTURE: CPT | Performed by: PHYSICIAN ASSISTANT

## 2025-03-24 PROCEDURE — 80061 LIPID PANEL: CPT | Performed by: PHYSICIAN ASSISTANT

## 2025-03-24 PROCEDURE — 82607 VITAMIN B-12: CPT | Performed by: PHYSICIAN ASSISTANT

## 2025-03-24 PROCEDURE — 82728 ASSAY OF FERRITIN: CPT | Performed by: PHYSICIAN ASSISTANT

## 2025-03-24 RX ORDER — FERROUS GLUCONATE 324(38)MG
324 TABLET ORAL
Qty: 90 TABLET | Refills: 3 | Status: SHIPPED | OUTPATIENT
Start: 2025-03-24

## 2025-03-24 RX ORDER — CYANOCOBALAMIN 1000 UG/ML
1000 INJECTION, SOLUTION INTRAMUSCULAR; SUBCUTANEOUS
Qty: 3 ML | Refills: 1 | Status: SHIPPED | OUTPATIENT
Start: 2025-03-24

## 2025-03-24 RX ORDER — LISINOPRIL 10 MG/1
10 TABLET ORAL DAILY
Qty: 90 TABLET | Refills: 3 | Status: SHIPPED | OUTPATIENT
Start: 2025-03-24

## 2025-03-24 RX ORDER — SIMVASTATIN 40 MG
TABLET ORAL
Qty: 90 TABLET | Refills: 3 | Status: SHIPPED | OUTPATIENT
Start: 2025-03-24

## 2025-03-24 RX ORDER — SILDENAFIL 100 MG/1
100 TABLET, FILM COATED ORAL DAILY PRN
Qty: 16 TABLET | Refills: 3 | Status: SHIPPED | OUTPATIENT
Start: 2025-03-24

## 2025-03-24 RX ORDER — OMEPRAZOLE 40 MG/1
40 CAPSULE, DELAYED RELEASE ORAL DAILY
Qty: 90 CAPSULE | Refills: 2 | Status: SHIPPED | OUTPATIENT
Start: 2025-03-24

## 2025-03-24 ASSESSMENT — PAIN SCALES - GENERAL: PAINLEVEL_OUTOF10: NO PAIN (0)

## 2025-03-24 NOTE — PROGRESS NOTES
"Preventive Care Visit  [facility]  Keanu Álvarez PA-C, Family Medicine  Mar 24, 2025      Assessment & Plan     Annual physical exam  Overall doing well.  Gaps in his care have been closed.  Will update screening lab    Essential Hypercholesterolemia  Will update screening labs.  Continue simvastatin 40 mg daily.  Recently bought a new bike so will be more active this spring and summer.  - CBC with platelets; Future  - Comprehensive metabolic panel (BMP + Alb, Alk Phos, ALT, AST, Total. Bili, TP); Future  - simvastatin (ZOCOR) 40 MG tablet; TAKE 1 TABLET BY MOUTH EVERYDAY AT BEDTIME  Recent Labs   Lab Test 03/20/24  0914 09/20/22  0924   CHOL 165 160   HDL 49 59   LDL 90 89   TRIG 128 60     Essential Hypertension  Pressures well-controlled at 136/76.  Continue lisinopril 10 mg daily  - Lipid panel reflex to direct LDL Fasting; Future  - lisinopril (ZESTRIL) 10 MG tablet; Take 1 tablet (10 mg) by mouth daily.    Vargas's esophagus with esophagitis  Denies dysphagia.  Last EGD 4/22/2024 recommended 3-year follow-up.  Continue omeprazole 40 mg daily  - omeprazole (PRILOSEC) 40 MG DR capsule; Take 1 capsule (40 mg) by mouth daily.    Cyst of pancreas  Continues to follow with gastroenterology.  Last visit with GI as of 8/23 underwent MRI which did not show any significant change.  They are planning to see him back August 2025.    Vitamin B12 deficiency (non anemic)  Will update vitamin B12.  Continue with B12 injections monthly  - Vitamin B12; Future  - syringe/needle, disp, 30G X 1/2\" 1 ML MISC; 50 each every 30 days.    Iron deficiency anemia, of uncertain etiology  Will update iron and ferritin.  Last iron level 3/24 stable at 39.  He continues on iron supplements daily.  Up-to-date on EGD and colonoscopy.  Denies any blood in his stool or urine.  - Iron and iron binding capacity; Future  - Ferritin; Future    Male Erectile Disorder Due To Physical Condition  Utilizing Viagra as " needed.    Restless leg syndrome  History of restless legs and iron deficiency anemia.  Restless legs are stable at this time.  Continue iron supplements.  - ferrous gluconate (FERGON) 324 (38 Fe) MG tablet; Take 1 tablet (324 mg) by mouth daily (with breakfast). Take with 2oz of citrus juice or a vitamin C supplement to aide in absorption.    Alcohol abuse  Continued to work on cutting back on alcohol use.    Screening for prostate cancer  - PSA, screen; Future    Erectile dysfunction, unspecified erectile dysfunction type  - sildenafil (VIAGRA) 100 MG tablet; Take 1 tablet (100 mg) by mouth daily as needed (erectile dysfunction).    Colon cancer screening  Up-to-date on colonoscopy.  Next colonoscopy due 4/2027    Class II obesity with a BMI of 35-39.9 without severe complications  BMI today 36.22.  Recently bought a new bike so we will be more after the summer.    Patient has been advised of split billing requirements and indicates understanding: Yes    The longitudinal plan of care for the diagnosis(es)/condition(s) as documented were addressed during this visit. Due to the added complexity in care, I will continue to support Norris in the subsequent management and with ongoing continuity of care.      Counseling  Appropriate preventive services were addressed with this patient via screening, questionnaire, or discussion as appropriate for fall prevention, nutrition, physical activity, Tobacco-use cessation, social engagement, weight loss and cognition.  Checklist reviewing preventive services available has been given to the patient.  Reviewed patient's diet, addressing concerns and/or questions.   The patient reports drinking more than 3 alcoholic drinks per day and/or more than 7 drhnks per week. The patient was counseled and given information about possible harmful effects of excessive alcohol intake.The patient was provided with written information regarding signs of hearing loss.         Subjective   Norris is  a 65 year old, presenting for the following:  Physical (Pt is fasting. )        3/24/2025     7:16 AM   Additional Questions   Roomed by Juliet SHELBY LPN           Norris is a pleasant 65-year-old male presents to the clinic today for a Medicare annual wellness visit.  Past medical history significant for hypertension, hyperlipidemia, iron deficiency anemia, B12 deficiency, Vargas's esophagus, restless leg syndrome, alcohol use disorder, and pancreatic cyst.  Overall he is feeling well.  No questions or concerns regarding his chronic health.           Advance Care Planning  Patient does not have a Health Care Directive:       3/19/2025   General Health   How would you rate your overall physical health? Good   Feel stress (tense, anxious, or unable to sleep) Only a little   (!) STRESS CONCERN      3/19/2025   Nutrition   Diet: Regular (no restrictions)         3/19/2025   Exercise   Days per week of moderate/strenous exercise 4 days   Average minutes spent exercising at this level 40 min         3/19/2025   Social Factors   Frequency of gathering with friends or relatives More than three times a week   Worry food won't last until get money to buy more No   Food not last or not have enough money for food? No   Do you have housing? (Housing is defined as stable permanent housing and does not include staying ouside in a car, in a tent, in an abandoned building, in an overnight shelter, or couch-surfing.) Yes   Are you worried about losing your housing? No   Lack of transportation? No   Unable to get utilities (heat,electricity)? No         3/19/2025   Fall Risk   Fallen 2 or more times in the past year? No   Trouble with walking or balance? No          3/19/2025   Activities of Daily Living- Home Safety   Needs help with the following daily activites None of the above   Safety concerns in the home None of the above         3/19/2025   Dental   Dentist two times every year? Yes         3/19/2025   Hearing Screening    Hearing concerns? (!) TROUBLE UNDERSTANDING SOFT OR WHISPERED SPEECH.         3/19/2025   Driving Risk Screening   Patient/family members have concerns about driving No         3/19/2025   General Alertness/Fatigue Screening   Have you been more tired than usual lately? No         3/19/2025   Urinary Incontinence Screening   Bothered by leaking urine in past 6 months No           3/13/2024   TB Screening   Were you born outside of the [place]? No           Today's PHQ-2 Score:       3/23/2025     9:22 AM   PHQ-2 ( 1999 [employer])   Q1: Little interest or pleasure in doing things 0   Q2: Feeling down, depressed or hopeless 0   PHQ-2 Score 0    Q1: Little interest or pleasure in doing things Not at all   Q2: Feeling down, depressed or hopeless Not at all   PHQ-2 Score 0       Patient-reported           3/19/2025   Substance Use   Alcohol more than 3/day or more than 7/wk Yes   How often do you have a drink containing alcohol 2 to 3 times a week   How many alcohol drinks on typical day 3 or 4   How often do you have 5+ drinks at one occasion Less than monthly   Audit 2/3 Score 2   How often not able to stop drinking once started Never   How often failed to do what normally expected Never   How often needed first drink in am after a heavy drinking session Never   How often feeling of guilt or remorse after drinking Never   How often unable to remember what happened the night before Never   Have you or someone else been injured because of your drinking No   Has anyone been concerned or suggested you cut down on drinking Yes, but not in the last year   TOTAL SCORE - AUDIT 7   Do you have a current opioid prescription? No   How severe/bad is pain from 1 to 10? 0/10 (No Pain)   Do you use any other substances recreationally? No     Social History     Tobacco Use    Smoking status: Former     Current packs/day: 0.00     Types: Cigarettes     Passive exposure: Current    Smokeless tobacco: Former     Quit date: 3/25/1985   Vaping  Use    Vaping status: Never Used   Substance Use Topics    Alcohol use: Yes     Alcohol/week: 12.0 standard drinks of alcohol     Types: 12 Standard drinks or equivalent per week    Drug use: No       Last PSA:   Prostate Specific Antigen Screen   Date Value Ref Range Status   03/20/2024 0.59 0.00 - 4.50 ng/mL Final   10/06/2021 0.56 0.00 - 4.50 ug/L Final     ASCVD Risk   The 10-year ASCVD risk score (Mita GREENBERG, et al., 2019) is: 17%    Values used to calculate the score:      Age: 65 years      Sex: Male      Is Non- : No      Diabetic: No      Tobacco smoker: No      Systolic Blood Pressure: 136 mmHg      Is BP treated: Yes      HDL Cholesterol: 49 mg/dL      Total Cholesterol: 165 mg/dL            Reviewed and updated as needed this visit by Provider                    Past Medical History:   Diagnosis Date    Colon cancer (H)     Generalized abdominal pain     Hypertension     Hypokalemia      BP Readings from Last 3 Encounters:   03/24/25 136/76   11/27/24 138/74   07/01/24 130/80    Wt Readings from Last 3 Encounters:   03/24/25 105.7 kg (233 lb)   11/27/24 98.9 kg (218 lb)   07/01/24 102.1 kg (225 lb)                  Patient Active Problem List   Diagnosis    Alcohol Abuse    Vitamin B12 deficiency (non anemic)    Essential Hypercholesterolemia    Essential Hypertension    Male Erectile Disorder Due To Physical Condition    Small bowel obstruction (H)    Other specified diseases of gallbladder    Vargas's esophagus with esophagitis    Post-cholecystectomy syndrome    History of colon cancer    Iron deficiency anemia, of uncertain etiology    Cyst of pancreas    Diaphragmatic hernia    Polyp of colon    Class 2 severe obesity with body mass index (BMI) of 35 to 39.9 with serious comorbidity (H)     Past Surgical History:   Procedure Laterality Date    ABDOMEN SURGERY      APPENDECTOMY      COLON SURGERY      COLONOSCOPY      LAPAROSCOPIC CHOLECYSTECTOMY  02/08/2021      "  Social History     Tobacco Use    Smoking status: Former     Current packs/day: 0.00     Types: Cigarettes     Passive exposure: Current    Smokeless tobacco: Former     Quit date: 3/25/1985   Substance Use Topics    Alcohol use: Yes     Alcohol/week: 12.0 standard drinks of alcohol     Types: 12 Standard drinks or equivalent per week     Family History   Problem Relation Age of Onset    Hyperlipidemia Father     Hypertension Father     Cerebrovascular Disease Father          Current Outpatient Medications   Medication Sig Dispense Refill    aspirin 81 mg chewable tablet [ASPIRIN 81 MG CHEWABLE TABLET] Chew 81 mg daily.      cyanocobalamin (CYANOCOBALAMIN) 1000 mcg/mL injection Inject 1 mL (1,000 mcg) into the muscle every 30 days. 3 mL 1    ferrous gluconate (FERGON) 324 (38 Fe) MG tablet Take 1 tablet (324 mg) by mouth daily (with breakfast). Take with 2oz of citrus juice or a vitamin C supplement to aide in absorption. 90 tablet 3    hydrochlorothiazide (MICROZIDE) 12.5 MG capsule TAKE 1 CAPSULE BY MOUTH EVERY DAY IN THE MORNING 90 capsule 3    lisinopril (ZESTRIL) 10 MG tablet Take 1 tablet (10 mg) by mouth daily. 90 tablet 3    multivitamin therapeutic (THERAGRAN) tablet [MULTIVITAMIN THERAPEUTIC (THERAGRAN) TABLET] Take 1 tablet by mouth daily.  0    omeprazole (PRILOSEC) 40 MG DR capsule Take 1 capsule (40 mg) by mouth daily. 90 capsule 2    sildenafil (VIAGRA) 100 MG tablet Take 1 tablet (100 mg) by mouth daily as needed (erectile dysfunction). 16 tablet 3    simvastatin (ZOCOR) 40 MG tablet TAKE 1 TABLET BY MOUTH EVERYDAY AT BEDTIME 90 tablet 3    syringe/needle, disp, 30G X 1/2\" 1 ML MISC 50 each every 30 days. 50 each 11     Current providers sharing in care for this patient include:  Patient Care Team:  Keanu Álvarez PA-C as PCP - General (Family Medicine)  Keanu Álvarez PA-C as Assigned PCP  Vic Jain DO as MD (Neurology)    The following health maintenance items are " "reviewed in Epic and correct as of today:  Health Maintenance   Topic Date Due    Pneumococcal Vaccine: 50+ Years (1 of 1 - PCV) Never done    BMP  03/20/2025    LIPID  03/20/2025    COVID-19 Vaccine (7 - 2024-25 season) 05/18/2025    ANNUAL REVIEW OF HM ORDERS  11/27/2025    MEDICARE ANNUAL WELLNESS VISIT  03/24/2026    FALL RISK ASSESSMENT  03/24/2026    DIABETES SCREENING  03/20/2027    COLORECTAL CANCER SCREENING  04/22/2027    ADVANCE CARE PLANNING  03/20/2029    DTAP/TDAP/TD IMMUNIZATION (2 - Td or Tdap) 05/20/2029    RSV VACCINE (1 - 1-dose 75+ series) 01/31/2033    HEPATITIS C SCREENING  Completed    PHQ-2 (once per calendar year)  Completed    INFLUENZA VACCINE  Completed    ZOSTER IMMUNIZATION  Completed    AORTIC ANEURYSM SCREENING (SYSTEM ASSIGNED)  Completed    HPV IMMUNIZATION  Aged Out    MENINGITIS IMMUNIZATION  Aged Out    LUNG CANCER SCREENING  Discontinued         Review of Systems  Constitutional, HEENT, cardiovascular, pulmonary, GI, , musculoskeletal, neuro, skin, endocrine and psych systems are negative, except as otherwise noted.     Objective    Exam  /76 (BP Location: Right arm, Patient Position: Sitting, Cuff Size: Adult Regular)   Pulse 88   Temp 98.3  F (36.8  C) (Oral)   Resp 16   Ht 1.708 m (5' 7.25\")   Wt 105.7 kg (233 lb)   SpO2 96%   BMI 36.22 kg/m     Estimated body mass index is 36.22 kg/m  as calculated from the following:    Height as of this encounter: 1.708 m (5' 7.25\").    Weight as of this encounter: 105.7 kg (233 lb).    Physical Exam  Vitals and nursing note reviewed.   Constitutional:       General: He is not in acute distress.     Appearance: Normal appearance. He is well-developed and well-groomed. He is not ill-appearing or toxic-appearing.   HENT:      Head: Normocephalic and atraumatic.      Right Ear: Tympanic membrane, ear canal and external ear normal.      Left Ear: Tympanic membrane, ear canal and external ear normal.      Mouth/Throat:      Lips: " Pink.      Mouth: Mucous membranes are moist.      Palate: No mass.      Pharynx: Oropharynx is clear. Uvula midline.      Tonsils: No tonsillar exudate or tonsillar abscesses.   Eyes:      General: Lids are normal.         Right eye: No discharge.         Left eye: No discharge.   Neck:      Trachea: Trachea normal.   Cardiovascular:      Rate and Rhythm: Normal rate and regular rhythm.      Heart sounds: S1 normal and S2 normal. No murmur heard.  Pulmonary:      Effort: Pulmonary effort is normal.      Breath sounds: Normal breath sounds and air entry.   Abdominal:      General: Bowel sounds are normal. There is no distension.      Palpations: Abdomen is soft.      Tenderness: There is no abdominal tenderness. There is no guarding or rebound.   Musculoskeletal:      Cervical back: Full passive range of motion without pain and neck supple.      Right lower leg: No edema.      Left lower leg: No edema.   Lymphadenopathy:      Cervical: No cervical adenopathy.   Skin:     General: Skin is warm and dry.      Findings: No lesion or rash.   Neurological:      General: No focal deficit present.      Mental Status: He is alert.      Cranial Nerves: No cranial nerve deficit.      Gait: Gait is intact.      Deep Tendon Reflexes:      Reflex Scores:       Patellar reflexes are 2+ on the right side and 2+ on the left side.  Psychiatric:         Attention and Perception: Attention normal.         Mood and Affect: Mood normal.         Speech: Speech normal.             3/24/2025   Mini Cog   Clock Draw Score 2 Normal   3 Item Recall 3 objects recalled   Mini Cog Total Score 5              Signed Electronically by: Keanu Álvarez PA-C

## 2025-03-31 DIAGNOSIS — E53.8 VITAMIN B12 DEFICIENCY (NON ANEMIC): ICD-10-CM

## 2025-04-01 DIAGNOSIS — E53.8 VITAMIN B12 DEFICIENCY (NON ANEMIC): ICD-10-CM

## 2025-04-01 NOTE — TELEPHONE ENCOUNTER
Clinic RN: Please contact patient because the medication requested is listed as historical or discontinued. Confirm patient is taking this medication.

## 2025-04-01 NOTE — TELEPHONE ENCOUNTER
3 ml syringes were ordered on 3/28, but no needles.     Order pended for provider review.    Charleen Bill RN     Problem: PAIN - ADULT  Goal: Verbalizes/displays adequate comfort level or baseline comfort level  Description: Interventions:  - Encourage patient to monitor pain and request assistance  - Assess pain using appropriate pain scale  - Administer analgesics based on type and severity of pain and evaluate response  - Implement non-pharmacological measures as appropriate and evaluate response  - Consider cultural and social influences on pain and pain management  - Notify physician/advanced practitioner if interventions unsuccessful or patient reports new pain  Outcome: Progressing     Problem: INFECTION - ADULT  Goal: Absence or prevention of progression during hospitalization  Description: INTERVENTIONS:  - Assess and monitor for signs and symptoms of infection  - Monitor lab/diagnostic results  - Monitor all insertion sites, i e  indwelling lines, tubes, and drains  - Monitor endotracheal if appropriate and nasal secretions for changes in amount and color  - [place] appropriate cooling/warming therapies per order  - Administer medications as ordered  - Instruct and encourage patient and family to use good hand hygiene technique  - Identify and instruct in appropriate isolation precautions for identified infection/condition  Outcome: Progressing     Problem: SAFETY ADULT  Goal: Patient will remain free of falls  Description: INTERVENTIONS:  - Educate patient/family on patient safety including physical limitations  - Instruct patient to call for assistance with activity   - Consult OT/PT to assist with strengthening/mobility   - Keep Call bell within reach  - Keep bed low and locked with side rails adjusted as appropriate  - Keep care items and personal belongings within reach  - Initiate and maintain comfort rounds  - Apply yellow socks and bracelet for high fall risk patients  - Consider moving patient to room near nurses station  Outcome: Progressing  Goal: Maintain or return to baseline ADL function  Description: INTERVENTIONS:  -  Assess patient's ability to carry out ADLs; assess patient's baseline for ADL function and identify physical deficits which impact ability to perform ADLs (bathing, care of mouth/teeth, toileting, grooming, dressing, etc )  - Assess/evaluate cause of self-care deficits   - Assess range of motion  - Assess patient's mobility; develop plan if impaired  - Assess patient's need for assistive devices and provide as appropriate  - Encourage maximum independence but intervene and supervise when necessary  - Involve family in performance of ADLs  - Assess for home care needs following discharge   - Consider OT consult to assist with ADL evaluation and planning for discharge  - Provide patient education as appropriate  Outcome: Progressing  Goal: Maintains/Returns to pre admission functional level  Description: INTERVENTIONS:  - Perform BMAT or MOVE assessment daily    - Set and communicate daily mobility goal to care team and patient/family/caregiver     - Collaborate with rehabilitation services on mobility goals if consulted  - Out of bed for meals 3 times a day  - Out of bed for toileting  - Record patient progress and toleration of activity level   Outcome: Progressing     Problem: DISCHARGE PLANNING  Goal: Discharge to home or other facility with appropriate resources  Description: INTERVENTIONS:  - Identify barriers to discharge w/patient and caregiver  - Arrange for needed discharge resources and transportation as appropriate  - Identify discharge learning needs (meds, wound care, etc )  - Arrange for interpretive services to assist at discharge as needed  - Refer to Case Management Department for coordinating discharge planning if the patient needs post-hospital services based on physician/advanced practitioner order or complex needs related to functional status, cognitive ability, or social support system  Outcome: Progressing     Problem: Knowledge Deficit  Goal: Patient/family/caregiver demonstrates understanding of disease process, treatment plan, medications, and discharge instructions  Description: Complete learning assessment and assess knowledge base    Interventions:  - Provide teaching at level of understanding  - Provide teaching via preferred learning methods  Outcome: Progressing     Problem: METABOLIC, FLUID AND ELECTROLYTES - ADULT  Goal: Electrolytes maintained within normal limits  Description: INTERVENTIONS:  - Monitor labs and assess patient for signs and symptoms of electrolyte imbalances  - Administer electrolyte replacement as ordered  - Monitor response to electrolyte replacements, including repeat lab results as appropriate  - Instruct patient on fluid and nutrition as appropriate  Outcome: Progressing  Goal: Fluid balance maintained  Description: INTERVENTIONS:  - Monitor labs   - Monitor I/O and WT  - Instruct patient on fluid and nutrition as appropriate  - Assess for signs & symptoms of volume excess or deficit  Outcome: Progressing     Problem: MOBILITY - ADULT  Goal: Maintain or return to baseline ADL function  Description: INTERVENTIONS:  -  Assess patient's ability to carry out ADLs; assess patient's baseline for ADL function and identify physical deficits which impact ability to perform ADLs (bathing, care of mouth/teeth, toileting, grooming, dressing, etc )  - Assess/evaluate cause of self-care deficits   - Assess range of motion  - Assess patient's mobility; develop plan if impaired  - Assess patient's need for assistive devices and provide as appropriate  - Encourage maximum independence but intervene and supervise when necessary  - Involve family in performance of ADLs  - Assess for home care needs following discharge   - Consider OT consult to assist with ADL evaluation and planning for discharge  - Provide patient education as appropriate  Outcome: Progressing  Goal: Maintains/Returns to pre admission functional level  Description: INTERVENTIONS:  - Perform BMAT or MOVE assessment daily    - Set and communicate daily mobility goal to care team and patient/family/caregiver     - Collaborate with rehabilitation services on mobility goals if consulted  - Out of bed for meals 3 times a day  - Out of bed for toileting  - Record patient progress and toleration of activity level   Outcome: Progressing     Problem: Potential for Falls  Goal: Patient will remain free of falls  Description: INTERVENTIONS:  - Educate patient/family on patient safety including physical limitations  - Instruct patient to call for assistance with activity   - Consult OT/PT to assist with strengthening/mobility   - Keep Call bell within reach  - Keep bed low and locked with side rails adjusted as appropriate  - Keep care items and personal belongings within reach  - Initiate and maintain comfort rounds  - Apply yellow socks and bracelet for high fall risk patients  - Consider moving patient to room near nurses station  Outcome: Progressing

## 2025-04-02 DIAGNOSIS — E53.8 VITAMIN B12 DEFICIENCY (NON ANEMIC): ICD-10-CM

## 2025-04-02 RX ORDER — NEEDLES, SAFETY 18GX1 1/2"
1 NEEDLE, DISPOSABLE MISCELLANEOUS
Qty: 50 EACH | Refills: 11 | Status: SHIPPED | OUTPATIENT
Start: 2025-04-02

## 2025-04-02 RX ORDER — SYRINGE W-NEEDLE,DISPOSAB,3 ML 23GX1"
SYRINGE, EMPTY DISPOSABLE MISCELLANEOUS
Qty: 50 EACH | Refills: 0 | Status: SHIPPED | OUTPATIENT
Start: 2025-04-02

## 2025-04-28 ENCOUNTER — TELEPHONE (OUTPATIENT)
Dept: FAMILY MEDICINE | Facility: CLINIC | Age: 65
End: 2025-04-28
Payer: MEDICARE

## 2025-04-28 DIAGNOSIS — E53.8 VITAMIN B12 DEFICIENCY (NON ANEMIC): ICD-10-CM

## 2025-04-28 RX ORDER — SYRINGE W-NEEDLE,DISPOSAB,3 ML 23GX1"
1 SYRINGE, EMPTY DISPOSABLE MISCELLANEOUS
Qty: 50 EACH | Refills: 11 | Status: SHIPPED | OUTPATIENT
Start: 2025-04-28

## 2025-04-29 DIAGNOSIS — I10 ESSENTIAL HYPERTENSION: ICD-10-CM

## 2025-04-29 DIAGNOSIS — E78.00 PURE HYPERCHOLESTEROLEMIA: ICD-10-CM

## 2025-04-29 RX ORDER — SIMVASTATIN 40 MG
TABLET ORAL
Qty: 90 TABLET | Refills: 3 | OUTPATIENT
Start: 2025-04-29

## 2025-04-29 RX ORDER — LISINOPRIL 10 MG/1
10 TABLET ORAL DAILY
Qty: 90 TABLET | Refills: 3 | OUTPATIENT
Start: 2025-04-29

## 2025-04-29 RX ORDER — HYDROCHLOROTHIAZIDE 12.5 MG/1
CAPSULE ORAL
Qty: 90 CAPSULE | Refills: 2 | Status: SHIPPED | OUTPATIENT
Start: 2025-04-29

## 2025-05-17 ENCOUNTER — HEALTH MAINTENANCE LETTER (OUTPATIENT)
Age: 65
End: 2025-05-17

## 2025-07-15 ENCOUNTER — HOSPITAL ENCOUNTER (OUTPATIENT)
Dept: MRI IMAGING | Facility: CLINIC | Age: 65
Discharge: HOME | End: 2025-07-15
Attending: INTERNAL MEDICINE
Payer: MEDICARE

## 2025-07-15 DIAGNOSIS — K86.2 PANCREATIC CYST: ICD-10-CM

## 2025-07-15 PROCEDURE — A9585 GADOBUTROL INJECTION: HCPCS | Performed by: INTERNAL MEDICINE

## 2025-07-15 PROCEDURE — 74183 MRI ABD W/O CNTR FLWD CNTR: CPT

## 2025-07-15 PROCEDURE — 255N000002 HC RX 255 OP 636: Performed by: INTERNAL MEDICINE

## 2025-07-15 RX ORDER — GADOBUTROL 604.72 MG/ML
0.1 INJECTION INTRAVENOUS ONCE
Status: COMPLETED | OUTPATIENT
Start: 2025-07-15 | End: 2025-07-15

## 2025-07-15 RX ADMIN — GADOBUTROL 10 ML: 604.72 INJECTION INTRAVENOUS at 09:34

## 2025-07-22 ENCOUNTER — TRANSFERRED RECORDS (OUTPATIENT)
Dept: ADMINISTRATIVE | Facility: CLINIC | Age: 65
End: 2025-07-22
Payer: MEDICARE

## 2025-07-23 NOTE — PROGRESS NOTES
2025        Norris Jolly   [place] [address]-      Norris Jolly,  :  1960    I'm writing to let you know about the tests that were taken recently.   Thank you for allowing [facility] the opportunity to take part in your healthcare.  At [facility] we strive to provide each patient with the finest gastroenterology care available.  We hope your experience was pleasant and informative.    Cyst is larger on your follow-up MRI. I will set up an EUS as discussed.      Thank you.    Electronically signed by:  Helio Novak MD 2025 02:35 PM  Document generated by:  Helio Novak MD  2025  If your provider ordered multiple tests; the results may not become available at the same time.  If multiple test results are received within 14 days of one another, you may receive a duplicate.  cc:  Kenau Álvarez PAC

## 2025-08-26 ENCOUNTER — OFFICE VISIT (OUTPATIENT)
Dept: FAMILY MEDICINE | Facility: CLINIC | Age: 65
End: 2025-08-26
Payer: COMMERCIAL

## 2025-08-26 VITALS
HEIGHT: 68 IN | BODY MASS INDEX: 34.28 KG/M2 | DIASTOLIC BLOOD PRESSURE: 72 MMHG | RESPIRATION RATE: 12 BRPM | OXYGEN SATURATION: 96 % | WEIGHT: 226.2 LBS | SYSTOLIC BLOOD PRESSURE: 130 MMHG | TEMPERATURE: 98 F | HEART RATE: 75 BPM

## 2025-08-26 DIAGNOSIS — E53.8 VITAMIN B12 DEFICIENCY: ICD-10-CM

## 2025-08-26 DIAGNOSIS — K86.2 CYST OF PANCREAS: ICD-10-CM

## 2025-08-26 DIAGNOSIS — I10 ESSENTIAL HYPERTENSION: ICD-10-CM

## 2025-08-26 DIAGNOSIS — K20.90 BARRETT'S ESOPHAGUS WITH ESOPHAGITIS: ICD-10-CM

## 2025-08-26 DIAGNOSIS — N52.9 ERECTILE DYSFUNCTION, UNSPECIFIED ERECTILE DYSFUNCTION TYPE: ICD-10-CM

## 2025-08-26 DIAGNOSIS — K22.70 BARRETT'S ESOPHAGUS WITH ESOPHAGITIS: ICD-10-CM

## 2025-08-26 DIAGNOSIS — R19.7 DIARRHEA, UNSPECIFIED TYPE: ICD-10-CM

## 2025-08-26 DIAGNOSIS — Z01.818 PREOP GENERAL PHYSICAL EXAM: Primary | ICD-10-CM

## 2025-08-26 DIAGNOSIS — G25.81 RESTLESS LEG SYNDROME: ICD-10-CM

## 2025-08-26 DIAGNOSIS — E78.00 PURE HYPERCHOLESTEROLEMIA: ICD-10-CM

## 2025-08-26 DIAGNOSIS — Z85.038 HISTORY OF COLON CANCER: ICD-10-CM

## 2025-08-26 DIAGNOSIS — F10.10 ALCOHOL ABUSE: ICD-10-CM

## 2025-08-26 DIAGNOSIS — D50.9 IRON DEFICIENCY ANEMIA, UNSPECIFIED IRON DEFICIENCY ANEMIA TYPE: ICD-10-CM

## 2025-08-26 LAB
ANION GAP SERPL CALCULATED.3IONS-SCNC: 16 MMOL/L (ref 7–15)
BUN SERPL-MCNC: 8 MG/DL (ref 8–23)
CALCIUM SERPL-MCNC: 9.3 MG/DL (ref 8.8–10.4)
CHLORIDE SERPL-SCNC: 101 MMOL/L (ref 98–107)
CREAT SERPL-MCNC: 0.72 MG/DL (ref 0.67–1.17)
EGFRCR SERPLBLD CKD-EPI 2021: >90 ML/MIN/1.73M2
ERYTHROCYTE [DISTWIDTH] IN BLOOD BY AUTOMATED COUNT: 12.4 % (ref 10–15)
GLUCOSE SERPL-MCNC: 105 MG/DL (ref 70–99)
HCO3 SERPL-SCNC: 25 MMOL/L (ref 22–29)
HCT VFR BLD AUTO: 42.7 % (ref 40–53)
HGB BLD-MCNC: 14.7 G/DL (ref 13.3–17.7)
MCH RBC QN AUTO: 31.1 PG (ref 26.5–33)
MCHC RBC AUTO-ENTMCNC: 34.4 G/DL (ref 31.5–36.5)
MCV RBC AUTO: 90.5 FL (ref 78–100)
PLATELET # BLD AUTO: 212 10E3/UL (ref 150–450)
POTASSIUM SERPL-SCNC: 4.4 MMOL/L (ref 3.4–5.3)
RBC # BLD AUTO: 4.72 10E6/UL (ref 4.4–5.9)
SODIUM SERPL-SCNC: 142 MMOL/L (ref 135–145)
WBC # BLD AUTO: 7.29 10E3/UL (ref 4–11)

## 2025-08-26 RX ORDER — CHOLESTYRAMINE 4 G/9G
POWDER, FOR SUSPENSION ORAL DAILY
Qty: 60 PACKET | Refills: 11 | Status: SHIPPED | OUTPATIENT
Start: 2025-08-26

## 2025-08-26 ASSESSMENT — ENCOUNTER SYMPTOMS
COLOR CHANGE: 0
ABDOMINAL PAIN: 0
VOMITING: 0
SEIZURES: 0
DIARRHEA: 1
HEMATURIA: 0
COUGH: 0
DYSURIA: 0
CHILLS: 0
FEVER: 0
ARTHRALGIAS: 0
EYE PAIN: 0
SORE THROAT: 0
PALPITATIONS: 0
SHORTNESS OF BREATH: 0
BACK PAIN: 0

## 2025-08-26 ASSESSMENT — PAIN SCALES - GENERAL: PAINLEVEL_OUTOF10: NO PAIN (0)
